# Patient Record
Sex: FEMALE | Race: BLACK OR AFRICAN AMERICAN | NOT HISPANIC OR LATINO | Employment: FULL TIME | URBAN - NONMETROPOLITAN AREA
[De-identification: names, ages, dates, MRNs, and addresses within clinical notes are randomized per-mention and may not be internally consistent; named-entity substitution may affect disease eponyms.]

---

## 2021-08-10 DIAGNOSIS — J32.9 SINUSITIS, UNSPECIFIED CHRONICITY, UNSPECIFIED LOCATION: Primary | ICD-10-CM

## 2021-08-10 RX ORDER — FLUTICASONE PROPIONATE 50 MCG
2 SPRAY, SUSPENSION (ML) NASAL DAILY
Qty: 9.9 ML | Refills: 1 | Status: SHIPPED | OUTPATIENT
Start: 2021-08-10 | End: 2022-02-04

## 2021-08-10 RX ORDER — FLUTICASONE PROPIONATE 50 MCG
2 SPRAY, SUSPENSION (ML) NASAL DAILY
COMMUNITY
Start: 2021-02-20 | End: 2021-08-10 | Stop reason: SDUPTHER

## 2021-11-03 ENCOUNTER — OFFICE VISIT (OUTPATIENT)
Dept: FAMILY MEDICINE | Facility: CLINIC | Age: 51
End: 2021-11-03
Payer: COMMERCIAL

## 2021-11-03 VITALS
RESPIRATION RATE: 18 BRPM | WEIGHT: 132.5 LBS | SYSTOLIC BLOOD PRESSURE: 151 MMHG | DIASTOLIC BLOOD PRESSURE: 99 MMHG | HEART RATE: 94 BPM | TEMPERATURE: 98 F | BODY MASS INDEX: 21.29 KG/M2 | OXYGEN SATURATION: 99 % | HEIGHT: 66 IN

## 2021-11-03 DIAGNOSIS — N76.0 ACUTE VAGINITIS: ICD-10-CM

## 2021-11-03 DIAGNOSIS — N76.0 BV (BACTERIAL VAGINOSIS): ICD-10-CM

## 2021-11-03 DIAGNOSIS — G89.29 CHRONIC NECK PAIN: ICD-10-CM

## 2021-11-03 DIAGNOSIS — B96.89 BV (BACTERIAL VAGINOSIS): ICD-10-CM

## 2021-11-03 DIAGNOSIS — M54.2 CHRONIC NECK PAIN: ICD-10-CM

## 2021-11-03 DIAGNOSIS — N89.8 VAGINAL DISCHARGE: Primary | ICD-10-CM

## 2021-11-03 LAB
BACTERIA VAG QL WET PREP: ABNORMAL /HPF
BILIRUB SERPL-MCNC: NEGATIVE MG/DL
BLOOD URINE, POC: NORMAL
CLUE CELLS VAG QL WET PREP: ABNORMAL /HPF
COLOR, POC UA: YELLOW
GLUCOSE UR QL STRIP: NEGATIVE
KETONES UR QL STRIP: NEGATIVE
LEUKOCYTE ESTERASE URINE, POC: NEGATIVE
NITRITE, POC UA: NEGATIVE
PH, POC UA: 5.5
PROTEIN, POC: NEGATIVE
RBC #/AREA VAG WET PREP: ABNORMAL /HPF
SPECIFIC GRAVITY, POC UA: 1.02
SQUAMOUS EPITHELIALS WET WOUNT, GENITAL: ABNORMAL /HPF
T VAGINALIS VAG QL WET PREP: ABNORMAL /HPF
UROBILINOGEN, POC UA: NEGATIVE
WBC CLUMPS WET MOUNT, GENITAL: ABNORMAL /HPF
WBC VAG QL WET PREP: ABNORMAL /HPF
YEAST VAG QL WET PREP: ABNORMAL /HPF

## 2021-11-03 PROCEDURE — 81003 URINALYSIS AUTO W/O SCOPE: CPT | Mod: QW,,, | Performed by: NURSE PRACTITIONER

## 2021-11-03 PROCEDURE — 99213 PR OFFICE/OUTPT VISIT, EST, LEVL III, 20-29 MIN: ICD-10-PCS | Mod: 25,,, | Performed by: NURSE PRACTITIONER

## 2021-11-03 PROCEDURE — 3077F PR MOST RECENT SYSTOLIC BLOOD PRESSURE >= 140 MM HG: ICD-10-PCS | Mod: ,,, | Performed by: NURSE PRACTITIONER

## 2021-11-03 PROCEDURE — 3008F BODY MASS INDEX DOCD: CPT | Mod: ,,, | Performed by: NURSE PRACTITIONER

## 2021-11-03 PROCEDURE — 3008F PR BODY MASS INDEX (BMI) DOCUMENTED: ICD-10-PCS | Mod: ,,, | Performed by: NURSE PRACTITIONER

## 2021-11-03 PROCEDURE — 3080F PR MOST RECENT DIASTOLIC BLOOD PRESSURE >= 90 MM HG: ICD-10-PCS | Mod: ,,, | Performed by: NURSE PRACTITIONER

## 2021-11-03 PROCEDURE — 3080F DIAST BP >= 90 MM HG: CPT | Mod: ,,, | Performed by: NURSE PRACTITIONER

## 2021-11-03 PROCEDURE — 96372 THER/PROPH/DIAG INJ SC/IM: CPT | Mod: ,,, | Performed by: NURSE PRACTITIONER

## 2021-11-03 PROCEDURE — 3077F SYST BP >= 140 MM HG: CPT | Mod: ,,, | Performed by: NURSE PRACTITIONER

## 2021-11-03 PROCEDURE — 87210 SMEAR WET MOUNT SALINE/INK: CPT | Mod: ,,, | Performed by: CLINICAL MEDICAL LABORATORY

## 2021-11-03 PROCEDURE — 87210 WET PREP, GENITAL: ICD-10-PCS | Mod: ,,, | Performed by: CLINICAL MEDICAL LABORATORY

## 2021-11-03 PROCEDURE — 99213 OFFICE O/P EST LOW 20 MIN: CPT | Mod: 25,,, | Performed by: NURSE PRACTITIONER

## 2021-11-03 PROCEDURE — 81003 POCT URINALYSIS W/O SCOPE: ICD-10-PCS | Mod: QW,,, | Performed by: NURSE PRACTITIONER

## 2021-11-03 PROCEDURE — 96372 PR INJECTION,THERAP/PROPH/DIAG2ST, IM OR SUBCUT: ICD-10-PCS | Mod: ,,, | Performed by: NURSE PRACTITIONER

## 2021-11-03 RX ORDER — ESTRADIOL 2 MG/1
2 TABLET ORAL DAILY
COMMUNITY
Start: 2021-09-22 | End: 2022-04-01 | Stop reason: SDUPTHER

## 2021-11-03 RX ORDER — KETOROLAC TROMETHAMINE 30 MG/ML
60 INJECTION, SOLUTION INTRAMUSCULAR; INTRAVENOUS
Status: COMPLETED | OUTPATIENT
Start: 2021-11-03 | End: 2021-11-03

## 2021-11-03 RX ORDER — FLUCONAZOLE 150 MG/1
150 TABLET ORAL DAILY
Qty: 1 TABLET | Refills: 0 | Status: SHIPPED | OUTPATIENT
Start: 2021-11-03 | End: 2021-11-04

## 2021-11-03 RX ADMIN — KETOROLAC TROMETHAMINE 60 MG: 30 INJECTION, SOLUTION INTRAMUSCULAR; INTRAVENOUS at 05:11

## 2021-11-08 RX ORDER — METRONIDAZOLE 500 MG/1
500 TABLET ORAL EVERY 12 HOURS
COMMUNITY
End: 2022-02-04

## 2021-11-08 RX ORDER — METRONIDAZOLE 7.5 MG/G
1 GEL VAGINAL NIGHTLY
Qty: 70 G | Refills: 0 | Status: SHIPPED | OUTPATIENT
Start: 2021-11-08 | End: 2022-02-04 | Stop reason: SDUPTHER

## 2021-11-09 RX ORDER — METRONIDAZOLE 500 MG/1
500 TABLET ORAL EVERY 12 HOURS
Qty: 14 TABLET | Refills: 0 | OUTPATIENT
Start: 2021-11-09

## 2021-12-21 ENCOUNTER — TELEPHONE (OUTPATIENT)
Dept: FAMILY MEDICINE | Facility: CLINIC | Age: 51
End: 2021-12-21
Payer: COMMERCIAL

## 2022-01-14 ENCOUNTER — IMMUNIZATION (OUTPATIENT)
Dept: FAMILY MEDICINE | Facility: CLINIC | Age: 52
End: 2022-01-14
Payer: COMMERCIAL

## 2022-01-14 DIAGNOSIS — Z23 NEED FOR VACCINATION: Primary | ICD-10-CM

## 2022-01-14 PROCEDURE — 91306 COVID-19, MRNA, LNP-S, PF, 100 MCG/0.25 ML DOSE VACCINE (MODERNA BOOSTER): ICD-10-PCS | Mod: ,,, | Performed by: NURSE PRACTITIONER

## 2022-01-14 PROCEDURE — 91306 COVID-19, MRNA, LNP-S, PF, 100 MCG/0.25 ML DOSE VACCINE (MODERNA BOOSTER): CPT | Mod: ,,, | Performed by: NURSE PRACTITIONER

## 2022-01-14 PROCEDURE — 0064A COVID-19, MRNA, LNP-S, PF, 100 MCG/0.25 ML DOSE VACCINE (MODERNA BOOSTER): ICD-10-PCS | Mod: ,,, | Performed by: NURSE PRACTITIONER

## 2022-01-14 PROCEDURE — 0064A COVID-19, MRNA, LNP-S, PF, 100 MCG/0.25 ML DOSE VACCINE (MODERNA BOOSTER): CPT | Mod: ,,, | Performed by: NURSE PRACTITIONER

## 2022-02-04 ENCOUNTER — OFFICE VISIT (OUTPATIENT)
Dept: FAMILY MEDICINE | Facility: CLINIC | Age: 52
End: 2022-02-04

## 2022-02-04 VITALS
WEIGHT: 132.38 LBS | BODY MASS INDEX: 21.28 KG/M2 | SYSTOLIC BLOOD PRESSURE: 150 MMHG | HEIGHT: 66 IN | TEMPERATURE: 98 F | DIASTOLIC BLOOD PRESSURE: 94 MMHG | HEART RATE: 88 BPM

## 2022-02-04 VITALS
RESPIRATION RATE: 16 BRPM | SYSTOLIC BLOOD PRESSURE: 135 MMHG | TEMPERATURE: 98 F | OXYGEN SATURATION: 99 % | DIASTOLIC BLOOD PRESSURE: 93 MMHG | HEIGHT: 66 IN | HEART RATE: 78 BPM | BODY MASS INDEX: 21.28 KG/M2 | WEIGHT: 132.38 LBS

## 2022-02-04 DIAGNOSIS — R59.0 ANTERIOR CERVICAL LYMPHADENOPATHY: ICD-10-CM

## 2022-02-04 DIAGNOSIS — B96.89 BV (BACTERIAL VAGINOSIS): ICD-10-CM

## 2022-02-04 DIAGNOSIS — I10 ESSENTIAL HYPERTENSION, BENIGN: Primary | ICD-10-CM

## 2022-02-04 DIAGNOSIS — Z12.11 SCREENING FOR MALIGNANT NEOPLASM OF COLON: ICD-10-CM

## 2022-02-04 DIAGNOSIS — N76.0 BV (BACTERIAL VAGINOSIS): ICD-10-CM

## 2022-02-04 DIAGNOSIS — Z12.39 ENCOUNTER FOR SCREENING FOR MALIGNANT NEOPLASM OF BREAST, UNSPECIFIED SCREENING MODALITY: Primary | ICD-10-CM

## 2022-02-04 DIAGNOSIS — G43.109 MIGRAINE WITH AURA AND WITHOUT STATUS MIGRAINOSUS, NOT INTRACTABLE: Chronic | ICD-10-CM

## 2022-02-04 DIAGNOSIS — R59.0 LOCALIZED ENLARGED LYMPH NODES: ICD-10-CM

## 2022-02-04 DIAGNOSIS — M54.2 NECK PAIN: ICD-10-CM

## 2022-02-04 DIAGNOSIS — R59.9 ENLARGED LYMPH NODE: ICD-10-CM

## 2022-02-04 RX ORDER — HYDROCHLOROTHIAZIDE 12.5 MG/1
12.5 TABLET ORAL DAILY
Qty: 30 TABLET | Refills: 5 | Status: SHIPPED | OUTPATIENT
Start: 2022-02-04 | End: 2022-02-04

## 2022-02-04 RX ORDER — MELOXICAM 15 MG/1
15 TABLET ORAL DAILY
Qty: 30 TABLET | Refills: 2 | Status: ON HOLD | OUTPATIENT
Start: 2022-02-04 | End: 2022-03-25 | Stop reason: ALTCHOICE

## 2022-02-04 RX ORDER — METRONIDAZOLE 7.5 MG/G
1 GEL VAGINAL NIGHTLY
Qty: 70 G | Refills: 0 | Status: ON HOLD | OUTPATIENT
Start: 2022-02-04 | End: 2022-03-25 | Stop reason: ALTCHOICE

## 2022-02-04 RX ORDER — KETOROLAC TROMETHAMINE 30 MG/ML
60 INJECTION, SOLUTION INTRAMUSCULAR; INTRAVENOUS
Status: COMPLETED | OUTPATIENT
Start: 2022-02-04 | End: 2022-02-04

## 2022-02-04 RX ORDER — TIZANIDINE 4 MG/1
4 TABLET ORAL EVERY 6 HOURS PRN
Qty: 30 TABLET | Refills: 2 | Status: SHIPPED | OUTPATIENT
Start: 2022-02-04 | End: 2022-02-04

## 2022-02-04 RX ORDER — METHOCARBAMOL 500 MG/1
500 TABLET, FILM COATED ORAL 3 TIMES DAILY
Qty: 60 TABLET | Refills: 0 | Status: ON HOLD | OUTPATIENT
Start: 2022-02-04 | End: 2022-03-25 | Stop reason: ALTCHOICE

## 2022-02-04 RX ADMIN — KETOROLAC TROMETHAMINE 60 MG: 30 INJECTION, SOLUTION INTRAMUSCULAR; INTRAVENOUS at 12:02

## 2022-02-04 NOTE — PROGRESS NOTES
[facility] - FAMILY MEDICINE  Phone: [phone]       PATIENT NAME: Thea Sun   : 1970    AGE: 51 y.o. DATE: 2022    MRN: [id number]        Reason for Visit / Chief Complaint:  Headache (Patient stated that she is here for chronic headaches. Patient stated that every since she gotten her booster she been having more frequent headaches. Patient stated that she is also here to get a lymphoid on the right side of her jaw checked out . )     Subjective:     HPI:  51 yr old AAF presents to the office for chronic HA  Deteriorating discs in neck - constant tension and pressure from neck to shoulders  Blood pressure elevated last couple of visits     Swollen lymph node under right jaw x 3 weeks     Review of Systems:    Pertinent items are above noted in HPI.    Review of patient's allergies indicates:   Allergen Reactions    Ampicillin Hives    Nalbuphine hcl Nausea And Vomiting        Med List:  Current Outpatient Medications on File Prior to Visit   Medication Sig Dispense Refill    estradioL (ESTRACE) 2 MG tablet Take 2 mg by mouth once daily.      fluticasone propionate (FLONASE) 50 mcg/actuation nasal spray 2 sprays (100 mcg total) by Each Nostril route once daily. (Patient not taking: Reported on 11/3/2021) 9.9 mL 1    metroNIDAZOLE (FLAGYL) 500 MG tablet Take 500 mg by mouth every 12 (twelve) hours.      [DISCONTINUED] metroNIDAZOLE (METROGEL) 0.75 % vaginal gel Place 1 applicator vaginally every evening. 70 g 0     No current facility-administered medications on file prior to visit.       Medical/Social/Family History:  No past medical history on file.   Social History     Tobacco Use   Smoking Status Never Smoker   Smokeless Tobacco Never Used      Social History     Substance and Sexual Activity   Alcohol Use Never       Family History   Problem Relation Age of Onset    Hypertension Mother     Cancer Mother     Heart failure Mother     Stroke  "Father     Dementia Father     Hypertension Father     Heart failure Sister     Hypertension Sister       Past Surgical History:   Procedure Laterality Date    ADENOIDECTOMY       SECTION      CHOLECYSTECTOMY      HYSTERECTOMY      OOPHORECTOMY        Objective:      Vitals:    22 1104 22 1106   BP: (!) 125/90 (!) 135/93   BP Location: Left arm Right arm   Patient Position: Sitting Sitting   Pulse: 78    Resp: 16    Temp: 97.5 °F (36.4 °C)    SpO2: 99%    Weight: 60.1 kg (132 lb 6.4 oz)    Height: 5' 6" (1.676 m)      Body mass index is 21.37 kg/m².     Physical Exam:    General: well developed, well nourished    Head: normocephalic, atraumatic    Neck: supple, symmetrical, trachea midline, no adenopathy and thyroid: not enlarged, symmetric, no tenderness/mass/nodules    Respiratory: unlabored respirations, no intercostal retractions or accessory muscle use    Cardiovascular: normal rate     Abdomen: soft, nontender    Musculoskeletal: negative; full range of motion, no tenderness, palpable spasm or pain on motion    Lymphadenopathy: right lymph node under jaw enlarged - non tender upon palpation     Neurological: normal without focal findings and mental status, speech normal, alert and oriented x3    Skin: Skin color, texture, turgor normal. No rashes or lesions    Psych: no auditory or visual hallucinations, no anxiety, no depression/worrying alot       Assessment:          ICD-10-CM ICD-9-CM   1. Essential hypertension, benign  I10 401.1   2. BV (bacterial vaginosis)  N76.0 616.10    B96.89 041.9   3. Neck pain  M54.2 723.1   4. Enlarged lymph node  R59.9 785.6        Plan:       Essential hypertension, benign  -     hydroCHLOROthiazide (HYDRODIURIL) 12.5 MG Tab; Take 1 tablet (12.5 mg total) by mouth once daily.  Dispense: 30 tablet; Refill: 5    BV (bacterial vaginosis)  -     metroNIDAZOLE (METROGEL) 0.75 % vaginal gel; Place 1 applicator vaginally every evening.  Dispense: 70 g; " Refill: 0    Neck pain  -     ketorolac injection 60 mg  -     tiZANidine (ZANAFLEX) 4 MG tablet; Take 1 tablet (4 mg total) by mouth every 6 (six) hours as needed (neck pain).  Dispense: 30 tablet; Refill: 2    Enlarged lymph node  -     US Soft Tissue Head Neck Thyroid; Future; Expected date: 02/04/2022    neck/pain likely cause of chronic headaches flaring up but need BP under control prior to spinal mgmt     Current Outpatient Medications:     estradioL (ESTRACE) 2 MG tablet, Take 2 mg by mouth once daily., Disp: , Rfl:     fluticasone propionate (FLONASE) 50 mcg/actuation nasal spray, 2 sprays (100 mcg total) by Each Nostril route once daily. (Patient not taking: Reported on 11/3/2021), Disp: 9.9 mL, Rfl: 1    hydroCHLOROthiazide (HYDRODIURIL) 12.5 MG Tab, Take 1 tablet (12.5 mg total) by mouth once daily., Disp: 30 tablet, Rfl: 5    metroNIDAZOLE (FLAGYL) 500 MG tablet, Take 500 mg by mouth every 12 (twelve) hours., Disp: , Rfl:     metroNIDAZOLE (METROGEL) 0.75 % vaginal gel, Place 1 applicator vaginally every evening., Disp: 70 g, Rfl: 0    tiZANidine (ZANAFLEX) 4 MG tablet, Take 1 tablet (4 mg total) by mouth every 6 (six) hours as needed (neck pain)., Disp: 30 tablet, Rfl: 2  No current facility-administered medications for this visit.        New & refilled meds:  Requested Prescriptions     Signed Prescriptions Disp Refills    metroNIDAZOLE (METROGEL) 0.75 % vaginal gel 70 g 0     Sig: Place 1 applicator vaginally every evening.    hydroCHLOROthiazide (HYDRODIURIL) 12.5 MG Tab 30 tablet 5     Sig: Take 1 tablet (12.5 mg total) by mouth once daily.    tiZANidine (ZANAFLEX) 4 MG tablet 30 tablet 2     Sig: Take 1 tablet (4 mg total) by mouth every 6 (six) hours as needed (neck pain).     Treatment Recommendations:    Orders and follow up as documented in patient record.    Return to clinic 1 week for BP recheck and as needed.    Signature: DAVIDE Meyer

## 2022-02-04 NOTE — PROGRESS NOTES
Buddy Jorgensen MD    [address] [place] [address]     PATIENT NAME: Thea Sun  : 1970  DATE: 22  MRN: [id number]      Billing Provider: Buddy Jorgensen MD  Level of Service: ND OFFICE/OUTPT VISIT, EST, LEVL IV, 30-39 MIN  Patient PCP Information     Provider PCP Type    [facility]          Reason for Visit / Chief Complaint: Headache (Pt. Presents to the clinic with complaints of headaches that have gotten worse since having the Covid booster. She does have chronic migraines but states this is not like her typical migraines. /She is also having pain/tension in her neck. States she was referred to [facility] in the past for this. She had three injections and then was told she would not be a surgery candidate. /Blood pressure is elevated in clinic today. )       Update PCP  Update Chief Complaint         History of Present Illness / Problem Focused Workflow     Thea Sun presents to the clinic with Headache (Pt. Presents to the clinic with complaints of headaches that have gotten worse since having the Covid booster. She does have chronic migraines but states this is not like her typical migraines. /She is also having pain/tension in her neck. States she was referred to [facility] in the past for this. She had three injections and then was told she would not be a surgery candidate. /Blood pressure is elevated in clinic today. )     Headaches - history of complex migraine syndrome, headaches are worsening since she got the COVID-19 booster, also has a history of cervical disc disease that lead to neck tension and pain, also thought to be a contributing factor to her headaches    Recent Headache - posterior, traveling down into her neck, unable to work the past 2 days, no sensitivity to light, no nausea,     HTN - today her blood pressure is elevated, several other recent blood pressures are elevated    Right cervical  lymphadenopathy - started about 3 weeks ago, it had enlarged at one point, but it is now somewhat better over the past couple of days, she took some advil pm and did not notice any improvement, believes she had a similar episode in the very distant past, only recent infection type symptoms were frontal headache which did occur before the lymphnode started swelling    She feels like she has some pressure in the right ear with some reduced hearing, she wears a headset at work most all day      Review of Systems     Review of Systems   Constitutional: Negative for activity change, appetite change, chills, fatigue and fever.   HENT: Negative for nasal congestion, ear pain, hearing loss, postnasal drip and sore throat.    Respiratory: Negative for cough, chest tightness, shortness of breath and wheezing.    Cardiovascular: Negative for chest pain, palpitations, leg swelling and claudication.   Gastrointestinal: Negative for abdominal pain, change in bowel habit, constipation, diarrhea, nausea, vomiting and change in bowel habit.   Genitourinary: Negative for dysuria.   Musculoskeletal: Negative for arthralgias, back pain, gait problem and myalgias.   Integumentary:  Negative for rash.   Neurological: Positive for headaches. Negative for weakness.   Psychiatric/Behavioral: Negative for suicidal ideas. The patient is not nervous/anxious.         Medical / Social / Family History     Past Medical History:   Diagnosis Date    Endometriosis     Migraines        Past Surgical History:   Procedure Laterality Date    APPENDECTOMY      bowel obstruction      Repair of bowel obstruction.    BREAST LUMPECTOMY Left      SECTION      CHOLECYSTECTOMY      HYSTERECTOMY      OOPHORECTOMY      UTERINE SUSPENSION         Social History  Ms.  reports that she has never smoked. She has never used smokeless tobacco. She reports that she does not drink alcohol and does not use drugs.    Family History  Ms.'s family history  includes Cancer in her mother; Dementia in her father; Heart failure in her mother and sister; Hypertension in her father, mother, and sister; Stroke in her father.    Medications and Allergies     Medications  Outpatient Medications Marked as Taking for the 2/4/22 encounter (Office Visit) with Buddy Jorgensen MD   Medication Sig Dispense Refill    estradioL (ESTRACE) 2 MG tablet Take 2 mg by mouth once daily.      metroNIDAZOLE (METROGEL) 0.75 % vaginal gel Place 1 applicator vaginally every evening. 70 g 0       Allergies  Review of patient's allergies indicates:   Allergen Reactions    Ampicillin Hives    Nalbuphine hcl Nausea And Vomiting       Physical Examination     Vitals:    02/04/22 1409   BP: (!) 150/94   Pulse:    Temp:      Physical Exam  Vitals and nursing note reviewed.   Constitutional:       General: She is not in acute distress.     Appearance: Normal appearance. She is not ill-appearing.   Eyes:      Extraocular Movements: Extraocular movements intact.      Pupils: Pupils are equal, round, and reactive to light.   Cardiovascular:      Rate and Rhythm: Normal rate and regular rhythm.      Heart sounds: Normal heart sounds.   Pulmonary:      Effort: Pulmonary effort is normal.      Breath sounds: Normal breath sounds.   Abdominal:      General: Bowel sounds are normal.      Palpations: Abdomen is soft.   Musculoskeletal:         General: Normal range of motion.   Skin:     Findings: No rash.   Neurological:      General: No focal deficit present.      Mental Status: She is alert and oriented to person, place, and time. Mental status is at baseline.   Psychiatric:         Mood and Affect: Mood normal.         Behavior: Behavior normal.          Assessment and Plan (including Health Maintenance)      Problem List  Smart Sets  Document Outside HM   :    Plan:         Health Maintenance Due   Topic Date Due    Hepatitis C Screening  Never done    Lipid Panel  Never done    HIV Screening   Never done    TETANUS VACCINE  Never done    Mammogram  Never done    Colorectal Cancer Screening  Never done       Problem List Items Addressed This Visit        Other    Anterior cervical lymphadenopathy    Relevant Orders    CT Soft Tissue Neck With Contrast      Other Visit Diagnoses     Encounter for screening for malignant neoplasm of breast, unspecified screening modality    -  Primary    Relevant Orders    Mammo Digital Screening Bilat    Screening for malignant neoplasm of colon        Relevant Orders    Ambulatory referral/consult to Gastroenterology    Localized enlarged lymph nodes        Relevant Orders    CT Soft Tissue Neck With Contrast    Migraine with aura and without status migrainosus, not intractable  (Chronic)           Encounter for screening for malignant neoplasm of breast, unspecified screening modality  -     Mammo Digital Screening Bilat; Future; Expected date: 02/04/2022    Screening for malignant neoplasm of colon  -     Ambulatory referral/consult to Gastroenterology; Future; Expected date: 02/11/2022    Anterior cervical lymphadenopathy  -     CT Soft Tissue Neck With Contrast; Future; Expected date: 02/04/2022    Localized enlarged lymph nodes  -     CT Soft Tissue Neck With Contrast; Future; Expected date: 02/04/2022    Migraine with aura and without status migrainosus, not intractable    Other orders  -     methocarbamoL (ROBAXIN) 500 MG Tab; Take 1 tablet (500 mg total) by mouth 3 (three) times daily. As needed for MUSCLE TENSION  Dispense: 60 tablet; Refill: 0  -     meloxicam (MOBIC) 15 MG tablet; Take 1 tablet (15 mg total) by mouth once daily. For PAIN and INFLAMMATION  Dispense: 30 tablet; Refill: 2       The patient has no Health Maintenance topics of status Not Due    Procedures     Future Appointments   Date Time Provider Department Center   2/24/2022  3:30 PM [facility] CT[facility] CT IC Isaac Mcgregor   2/24/2022  4:30 PM Buddy Jorgensen MD [facility] SHAHZAD Lopez         Follow up if symptoms worsen or fail to improve.     Signature:  Buddy Jorgensen MD  [address] [place] [address]  Phone #: [phone]  Fax #: [phone]    Date of encounter: 2/4/22    Patient Instructions   Patient Education       Lymphadenitis Discharge Instructions   About this topic   Lymphadenitis is another word for a swollen lymph node. A lymph node is a pea-shaped part of the lymph system that helps defend our body from germs. You have lymph nodes all over your body. They help trap foreign material and other cells that do not belong inside the body.  A swollen lymph node may mean there is an infection or disease in the body. Treatment for this condition depends on the cause. Most often, you will need drugs to treat your illness.  What care is needed at home?   · Ask your doctor what you need to do when you go home. Make sure you ask questions if you do not understand what the doctor says.  · Do not squeeze or try to drain the lymph node. This can make any infection you may have worse.  · Place an ice pack or a bag of frozen peas wrapped in a towel over the painful part. Never put ice right on the skin. Do not leave the ice on more than 10 to 15 minutes at a time.  · If your doctor tells you to use heat, put a heating pad on your painful area for no more than 20 minutes at a time. Never go to sleep with a heating pad on as this can cause burns.  What follow-up care is needed?   Your doctor may ask you to make visits to the office to check on your progress. Be sure to keep these visits.  What drugs may be needed?   The doctor may order drugs to:  · Help with pain and swelling  · Treat an infection  · Treat the illness that causes the lymph nodes to swell and be painful  What problems could happen?   · Infection  · Pain  When do I need to call the doctor?   · Signs of infection. These include a fever of 100.4°F (38°C) or higher, chills.  · More  swelling, redness, or warmth over your lymph node.  · Lymph node is draining fluid or pus.  · Lymph nodes seem to be getting bigger.  · Night sweats  · Trouble swallowing or breathing  Teach Back: Helping You Understand   The Teach Back Method helps you understand the information we are giving you. After you talk with the staff, tell them in your own words what you learned. This helps to make sure the staff has described each thing clearly. It also helps to explain things that may have been confusing. Before going home, make sure you can do these:  · I can tell you about my condition.  · I can tell you what may help ease my pain.  · I can tell you what I will do if I have a fever, my lymph nodes stay large or seem to be getting bigger, or I have trouble breathing or swallowing.  Last Reviewed Date   2019-04-18  Consumer Information Use and Disclaimer   This information is not specific medical advice and does not replace information you receive from your health care provider. This is only a brief summary of general information. It does NOT include all information about conditions, illnesses, injuries, tests, procedures, treatments, therapies, discharge instructions or life-style choices that may apply to you. You must talk with your health care provider for complete information about your health and treatment options. This information should not be used to decide whether or not to accept your health care providers advice, instructions or recommendations. Only your health care provider has the knowledge and training to provide advice that is right for you.  Copyright   Copyright © 2021 [employer] and its affiliates and/or licensors. All rights reserved.  Patient Education       Migraines Discharge Instructions   About this topic   A migraine is a specific type of headache. All headaches are not migraines. A migraine is caused by abnormal brain activity. It may be due to widening or narrowing of the blood vessels  in the head. It may last for a couple of hours or even a few days.  There are two types of migraine headaches:  · Classic migraine or migraine with aura ? It often starts with some problems in your eyesight called auras. You may see spots, dots, or even zigzag lines. Some people will lose part of their vision. An aura is the signal that a migraine is coming. It is followed by a very bad headache on one side of the head. Noise, light, and other activities can make it worse. Sometimes, it comes with upset stomach and throwing up.  · Common migraine or migraine without aura ? This migraine happens without the signal that the headache is coming.  Migraines can be treated with different drugs. Some drugs treat the pain. Other drugs stop the brain activity that causes the migraine. If migraines happen often, drugs that prevent migraines can help. Migraines can be helped by sleep. Stress control like exercise, relaxation, and a regular routine of good nutrition and sleep are all helpful in preventing migraines. Lifestyle and dietary changes may also help you deal with a migraine.         What care is needed at home?   · Ask your doctor what you need to do when you go home. Make sure you ask questions if you do not understand what the doctor says. This way you will know what you need to do.  · Your doctor may give you drugs to help with the pain. Take them as ordered by your doctor.  · Your doctor may teach you some ways to help control your migraine like relaxation and exercises.  · Keep a diary about your headaches. Write down when your headache happens. Write down what you were doing before it happened. Write down any foods or drinks that you had in the last day. This will help you learn what might be causing your headaches. Then, you can learn how to avoid them.  · Place an ice pack or a bag of frozen peas wrapped in a towel over your head. Never put ice right on the skin. Do not leave the ice on more than 10 to 15  minutes at a time.  · Using heat may also help. Try using a heating pad on the back of your neck. A warm shower or bath may also relax tight muscles.  · Avoid bright lights and noise. Rest in a quiet, dark room. Sleep may also help.  · Drink a caffeinated beverage. Be careful to not drink too much caffeine as this can cause other headaches.  · Do not make any big decisions until your migraine goes away.  · If certain drugs your doctor ordered trigger your migraine, your doctor may tell you to stop taking those drugs.  What follow-up care is needed?   · Your doctor may ask you to make visits to the office to check on your progress. Be sure to keep these visits.  · Your doctor may send you to a specialist called a neurologist.  · Your doctor may request that you get a brain MRI.  What drugs may be needed?   The doctor may order drugs to:  · Help with pain  · Relieve the migraine  · Prevent a migraine attack  · Treat an upset stomach and throwing up  · Treat a hormonal problem  Will physical activity be limited?   · Regular exercise can help prevent migraines. If exercise triggers your migraine, talk to your doctor.  · Do not drive or run machinery until your migraine goes away.  What changes to diet are needed?   · Do not skip or delay meals. Drink 6 to 8 glasses of water each day. This may help prevent migraines.  · Avoid foods that may trigger the attack. These may include processed, fermented, pickled, or marinated foods. Some of these are baked goods, chocolate, dairy products, nuts, onions, and peanut butter. Others are fruits like avocado, banana, or citrus fruit and meats like persaud, hot dogs, and cured meats.  · Limit caffeine intake. You will find that caffeine may help relieve pain. But, too much caffeine may also trigger an attack.  · Avoid drinking beer, wine, and mixed drinks (alcohol) if you think this is causing your migraines.  · Quit smoking. Smoking can worsen your migraine.  What problems could  happen?   · Loss of work time or missing school if migraines come often  · Low mood, worry  · Poor quality of life  · Migraines can slightly raise your chances of having a stroke   What can be done to prevent this health problem?   · Some drugs may help prevent migraines. Talk to your doctor about the drugs you may need to take.  · Keep a sleeping routine. Go to sleep and get up the same time every day.  · Be active. Exercise can reduce your risk of having migraines.  · Take note of the things that may trigger your migraine. Learning what they are is very important to help avoid an attack.  · Try to keep stress in your life low. Try relaxation exercises or meditation to lower stress.  When do I need to call the doctor?   · Signs of a bad reaction. These include very bad headache beyond the usual; speech, vision, motion problems or loss of balance; headaches are worse when lying down or headaches suddenly starts. Call for emergency help right away.  · Changes in migraine attacks  · Migraines that happen more often than 3 times a month  · You are not feeling better in 2 to 3 days or you are feeling worse  Teach Back: Helping You Understand   The Teach Back Method helps you understand the information we are giving you. After you talk with the staff, tell them in your own words what you learned. This helps to make sure the staff has described each thing clearly. It also helps to explain things that may have been confusing. Before going home, make sure you can do these:  · I can tell you about my condition.  · I can tell you what may help ease my pain.  · I can tell you what I will do if there is a change in my headaches.  Where can I learn more?   American Academy of Pediatrics  https://www.healthychildren.org/English/health-issues/conditions/head-neck-nervous-system/Pages/Migraine-Headaches-in-Children.aspx   NHS Choices  http://www.nhs.uk/Conditions/Migraine/Pages/Causes.aspx   Last Reviewed Date    2020-05-12  Consumer Information Use and Disclaimer   This information is not specific medical advice and does not replace information you receive from your health care provider. This is only a brief summary of general information. It does NOT include all information about conditions, illnesses, injuries, tests, procedures, treatments, therapies, discharge instructions or life-style choices that may apply to you. You must talk with your health care provider for complete information about your health and treatment options. This information should not be used to decide whether or not to accept your health care providers advice, instructions or recommendations. Only your health care provider has the knowledge and training to provide advice that is right for you.  Copyright   Copyright © 2021 [employer] and its affiliates and/or licensors. All rights reserved.

## 2022-02-04 NOTE — LETTER
February 4, 2022    Thea Sun  [address]  [place] [address]         [place] [address] [place] [address]  Phone: [phone]  Fax: [phone] February 4, 2022     Patient: Thea Sun   YOB: 1970   Date of Visit: 2/4/2022       To Whom It May Concern:    It is my medical opinion that Thea Sun needs to be excused from work on 2/3/2022 - 2/4/2022.     If you have any questions or concerns, please don't hesitate to call.    Sincerely,        TRENT Bautista

## 2022-02-11 NOTE — PATIENT INSTRUCTIONS
Patient Education       Lymphadenitis Discharge Instructions   About this topic   Lymphadenitis is another word for a swollen lymph node. A lymph node is a pea-shaped part of the lymph system that helps defend our body from germs. You have lymph nodes all over your body. They help trap foreign material and other cells that do not belong inside the body.  A swollen lymph node may mean there is an infection or disease in the body. Treatment for this condition depends on the cause. Most often, you will need drugs to treat your illness.  What care is needed at home?   · Ask your doctor what you need to do when you go home. Make sure you ask questions if you do not understand what the doctor says.  · Do not squeeze or try to drain the lymph node. This can make any infection you may have worse.  · Place an ice pack or a bag of frozen peas wrapped in a towel over the painful part. Never put ice right on the skin. Do not leave the ice on more than 10 to 15 minutes at a time.  · If your doctor tells you to use heat, put a heating pad on your painful area for no more than 20 minutes at a time. Never go to sleep with a heating pad on as this can cause burns.  What follow-up care is needed?   Your doctor may ask you to make visits to the office to check on your progress. Be sure to keep these visits.  What drugs may be needed?   The doctor may order drugs to:  · Help with pain and swelling  · Treat an infection  · Treat the illness that causes the lymph nodes to swell and be painful  What problems could happen?   · Infection  · Pain  When do I need to call the doctor?   · Signs of infection. These include a fever of 100.4°F (38°C) or higher, chills.  · More swelling, redness, or warmth over your lymph node.  · Lymph node is draining fluid or pus.  · Lymph nodes seem to be getting bigger.  · Night sweats  · Trouble swallowing or breathing  Teach Back: Helping You Understand   The Teach Back Method helps you understand the  information we are giving you. After you talk with the staff, tell them in your own words what you learned. This helps to make sure the staff has described each thing clearly. It also helps to explain things that may have been confusing. Before going home, make sure you can do these:  · I can tell you about my condition.  · I can tell you what may help ease my pain.  · I can tell you what I will do if I have a fever, my lymph nodes stay large or seem to be getting bigger, or I have trouble breathing or swallowing.  Last Reviewed Date   2019-04-18  Consumer Information Use and Disclaimer   This information is not specific medical advice and does not replace information you receive from your health care provider. This is only a brief summary of general information. It does NOT include all information about conditions, illnesses, injuries, tests, procedures, treatments, therapies, discharge instructions or life-style choices that may apply to you. You must talk with your health care provider for complete information about your health and treatment options. This information should not be used to decide whether or not to accept your health care providers advice, instructions or recommendations. Only your health care provider has the knowledge and training to provide advice that is right for you.  Copyright   Copyright © 2021 [employer] and its affiliates and/or licensors. All rights reserved.  Patient Education       Migraines Discharge Instructions   About this topic   A migraine is a specific type of headache. All headaches are not migraines. A migraine is caused by abnormal brain activity. It may be due to widening or narrowing of the blood vessels in the head. It may last for a couple of hours or even a few days.  There are two types of migraine headaches:  · Classic migraine or migraine with aura ? It often starts with some problems in your eyesight called auras. You may see spots, dots, or even zigzag lines.  Some people will lose part of their vision. An aura is the signal that a migraine is coming. It is followed by a very bad headache on one side of the head. Noise, light, and other activities can make it worse. Sometimes, it comes with upset stomach and throwing up.  · Common migraine or migraine without aura ? This migraine happens without the signal that the headache is coming.  Migraines can be treated with different drugs. Some drugs treat the pain. Other drugs stop the brain activity that causes the migraine. If migraines happen often, drugs that prevent migraines can help. Migraines can be helped by sleep. Stress control like exercise, relaxation, and a regular routine of good nutrition and sleep are all helpful in preventing migraines. Lifestyle and dietary changes may also help you deal with a migraine.         What care is needed at home?   · Ask your doctor what you need to do when you go home. Make sure you ask questions if you do not understand what the doctor says. This way you will know what you need to do.  · Your doctor may give you drugs to help with the pain. Take them as ordered by your doctor.  · Your doctor may teach you some ways to help control your migraine like relaxation and exercises.  · Keep a diary about your headaches. Write down when your headache happens. Write down what you were doing before it happened. Write down any foods or drinks that you had in the last day. This will help you learn what might be causing your headaches. Then, you can learn how to avoid them.  · Place an ice pack or a bag of frozen peas wrapped in a towel over your head. Never put ice right on the skin. Do not leave the ice on more than 10 to 15 minutes at a time.  · Using heat may also help. Try using a heating pad on the back of your neck. A warm shower or bath may also relax tight muscles.  · Avoid bright lights and noise. Rest in a quiet, dark room. Sleep may also help.  · Drink a caffeinated beverage. Be  careful to not drink too much caffeine as this can cause other headaches.  · Do not make any big decisions until your migraine goes away.  · If certain drugs your doctor ordered trigger your migraine, your doctor may tell you to stop taking those drugs.  What follow-up care is needed?   · Your doctor may ask you to make visits to the office to check on your progress. Be sure to keep these visits.  · Your doctor may send you to a specialist called a neurologist.  · Your doctor may request that you get a brain MRI.  What drugs may be needed?   The doctor may order drugs to:  · Help with pain  · Relieve the migraine  · Prevent a migraine attack  · Treat an upset stomach and throwing up  · Treat a hormonal problem  Will physical activity be limited?   · Regular exercise can help prevent migraines. If exercise triggers your migraine, talk to your doctor.  · Do not drive or run machinery until your migraine goes away.  What changes to diet are needed?   · Do not skip or delay meals. Drink 6 to 8 glasses of water each day. This may help prevent migraines.  · Avoid foods that may trigger the attack. These may include processed, fermented, pickled, or marinated foods. Some of these are baked goods, chocolate, dairy products, nuts, onions, and peanut butter. Others are fruits like avocado, banana, or citrus fruit and meats like persaud, hot dogs, and cured meats.  · Limit caffeine intake. You will find that caffeine may help relieve pain. But, too much caffeine may also trigger an attack.  · Avoid drinking beer, wine, and mixed drinks (alcohol) if you think this is causing your migraines.  · Quit smoking. Smoking can worsen your migraine.  What problems could happen?   · Loss of work time or missing school if migraines come often  · Low mood, worry  · Poor quality of life  · Migraines can slightly raise your chances of having a stroke   What can be done to prevent this health problem?   · Some drugs may help prevent migraines.  Talk to your doctor about the drugs you may need to take.  · Keep a sleeping routine. Go to sleep and get up the same time every day.  · Be active. Exercise can reduce your risk of having migraines.  · Take note of the things that may trigger your migraine. Learning what they are is very important to help avoid an attack.  · Try to keep stress in your life low. Try relaxation exercises or meditation to lower stress.  When do I need to call the doctor?   · Signs of a bad reaction. These include very bad headache beyond the usual; speech, vision, motion problems or loss of balance; headaches are worse when lying down or headaches suddenly starts. Call for emergency help right away.  · Changes in migraine attacks  · Migraines that happen more often than 3 times a month  · You are not feeling better in 2 to 3 days or you are feeling worse  Teach Back: Helping You Understand   The Teach Back Method helps you understand the information we are giving you. After you talk with the staff, tell them in your own words what you learned. This helps to make sure the staff has described each thing clearly. It also helps to explain things that may have been confusing. Before going home, make sure you can do these:  · I can tell you about my condition.  · I can tell you what may help ease my pain.  · I can tell you what I will do if there is a change in my headaches.  Where can I learn more?   American Academy of Pediatrics  https://www.healthychildren.org/English/health-issues/conditions/head-neck-nervous-system/Pages/Migraine-Headaches-in-Children.aspx   NHS Choices  http://www.nhs.uk/Conditions/Migraine/Pages/Causes.aspx   Last Reviewed Date   2020-05-12  Consumer Information Use and Disclaimer   This information is not specific medical advice and does not replace information you receive from your health care provider. This is only a brief summary of general information. It does NOT include all information about conditions,  illnesses, injuries, tests, procedures, treatments, therapies, discharge instructions or life-style choices that may apply to you. You must talk with your health care provider for complete information about your health and treatment options. This information should not be used to decide whether or not to accept your health care providers advice, instructions or recommendations. Only your health care provider has the knowledge and training to provide advice that is right for you.  Copyright   Copyright © 2021 [employer] and its affiliates and/or licensors. All rights reserved.

## 2022-02-21 LAB — CRC RECOMMENDATION EXT: NORMAL

## 2022-02-24 ENCOUNTER — HOSPITAL ENCOUNTER (OUTPATIENT)
Dept: RADIOLOGY | Facility: HOSPITAL | Age: 52
Discharge: HOME | End: 2022-02-24
Attending: FAMILY MEDICINE
Payer: COMMERCIAL

## 2022-02-24 ENCOUNTER — OFFICE VISIT (OUTPATIENT)
Dept: FAMILY MEDICINE | Facility: CLINIC | Age: 52
End: 2022-02-24
Payer: COMMERCIAL

## 2022-02-24 VITALS
WEIGHT: 133.19 LBS | SYSTOLIC BLOOD PRESSURE: 140 MMHG | BODY MASS INDEX: 21.4 KG/M2 | HEART RATE: 84 BPM | RESPIRATION RATE: 18 BRPM | DIASTOLIC BLOOD PRESSURE: 90 MMHG | TEMPERATURE: 98 F | OXYGEN SATURATION: 99 % | HEIGHT: 66 IN

## 2022-02-24 DIAGNOSIS — G89.29 CHRONIC NECK PAIN: ICD-10-CM

## 2022-02-24 DIAGNOSIS — Z11.4 SCREENING FOR HIV (HUMAN IMMUNODEFICIENCY VIRUS): ICD-10-CM

## 2022-02-24 DIAGNOSIS — R59.0 ANTERIOR CERVICAL LYMPHADENOPATHY: ICD-10-CM

## 2022-02-24 DIAGNOSIS — Z11.59 ENCOUNTER FOR HEPATITIS C SCREENING TEST FOR LOW RISK PATIENT: ICD-10-CM

## 2022-02-24 DIAGNOSIS — Z13.220 SCREENING FOR LIPOID DISORDERS: ICD-10-CM

## 2022-02-24 DIAGNOSIS — R59.0 LOCALIZED ENLARGED LYMPH NODES: ICD-10-CM

## 2022-02-24 DIAGNOSIS — M54.2 CHRONIC NECK PAIN: ICD-10-CM

## 2022-02-24 DIAGNOSIS — Z00.01 ENCOUNTER FOR ANNUAL GENERAL MEDICAL EXAMINATION WITH ABNORMAL FINDINGS IN ADULT: Primary | ICD-10-CM

## 2022-02-24 DIAGNOSIS — Z13.1 SCREENING FOR DIABETES MELLITUS: ICD-10-CM

## 2022-02-24 LAB
CHOLEST SERPL-MCNC: 201 MG/DL (ref 0–200)
CHOLEST/HDLC SERPL: 3.6 {RATIO}
EST. AVERAGE GLUCOSE BLD GHB EST-MCNC: 97 MG/DL
GLUCOSE SERPL-MCNC: 90 MG/DL (ref 74–106)
HBA1C MFR BLD HPLC: 5.5 % (ref 4.5–6.6)
HCV AB SER QL: NORMAL
HDLC SERPL-MCNC: 56 MG/DL (ref 40–60)
HIV 1+O+2 AB SERPL QL: NORMAL
LDLC SERPL CALC-MCNC: 105 MG/DL
LDLC/HDLC SERPL: 1.9 {RATIO}
NONHDLC SERPL-MCNC: 145 MG/DL
TRIGL SERPL-MCNC: 199 MG/DL (ref 35–150)
VLDLC SERPL-MCNC: 40 MG/DL

## 2022-02-24 PROCEDURE — 3008F PR BODY MASS INDEX (BMI) DOCUMENTED: ICD-10-PCS | Mod: ,,, | Performed by: FAMILY MEDICINE

## 2022-02-24 PROCEDURE — 99396 PR PREVENTIVE VISIT,EST,40-64: ICD-10-PCS | Mod: ,,, | Performed by: FAMILY MEDICINE

## 2022-02-24 PROCEDURE — 3044F PR MOST RECENT HEMOGLOBIN A1C LEVEL <7.0%: ICD-10-PCS | Mod: ,,, | Performed by: FAMILY MEDICINE

## 2022-02-24 PROCEDURE — 80061 LIPID PANEL: ICD-10-PCS | Mod: ,,, | Performed by: CLINICAL MEDICAL LABORATORY

## 2022-02-24 PROCEDURE — 1160F RVW MEDS BY RX/DR IN RCRD: CPT | Mod: ,,, | Performed by: FAMILY MEDICINE

## 2022-02-24 PROCEDURE — 3077F SYST BP >= 140 MM HG: CPT | Mod: ,,, | Performed by: FAMILY MEDICINE

## 2022-02-24 PROCEDURE — 25500020 PHARM REV CODE 255: Performed by: FAMILY MEDICINE

## 2022-02-24 PROCEDURE — 86803 HEPATITIS C ANTIBODY: ICD-10-PCS | Mod: ,,, | Performed by: CLINICAL MEDICAL LABORATORY

## 2022-02-24 PROCEDURE — 3080F DIAST BP >= 90 MM HG: CPT | Mod: ,,, | Performed by: FAMILY MEDICINE

## 2022-02-24 PROCEDURE — 3080F PR MOST RECENT DIASTOLIC BLOOD PRESSURE >= 90 MM HG: ICD-10-PCS | Mod: ,,, | Performed by: FAMILY MEDICINE

## 2022-02-24 PROCEDURE — 87389 HIV 1 / 2 ANTIBODY: ICD-10-PCS | Mod: ,,, | Performed by: CLINICAL MEDICAL LABORATORY

## 2022-02-24 PROCEDURE — 3044F HG A1C LEVEL LT 7.0%: CPT | Mod: ,,, | Performed by: FAMILY MEDICINE

## 2022-02-24 PROCEDURE — 70491 CT SOFT TISSUE NECK W/DYE: CPT | Mod: TC

## 2022-02-24 PROCEDURE — 1159F PR MEDICATION LIST DOCUMENTED IN MEDICAL RECORD: ICD-10-PCS | Mod: ,,, | Performed by: FAMILY MEDICINE

## 2022-02-24 PROCEDURE — 82947 ASSAY GLUCOSE BLOOD QUANT: CPT | Mod: ,,, | Performed by: CLINICAL MEDICAL LABORATORY

## 2022-02-24 PROCEDURE — 86803 HEPATITIS C AB TEST: CPT | Mod: ,,, | Performed by: CLINICAL MEDICAL LABORATORY

## 2022-02-24 PROCEDURE — 83036 HEMOGLOBIN A1C: ICD-10-PCS | Mod: ,,, | Performed by: CLINICAL MEDICAL LABORATORY

## 2022-02-24 PROCEDURE — 87389 HIV-1 AG W/HIV-1&-2 AB AG IA: CPT | Mod: ,,, | Performed by: CLINICAL MEDICAL LABORATORY

## 2022-02-24 PROCEDURE — 1160F PR REVIEW ALL MEDS BY PRESCRIBER/CLIN PHARMACIST DOCUMENTED: ICD-10-PCS | Mod: ,,, | Performed by: FAMILY MEDICINE

## 2022-02-24 PROCEDURE — 82947 GLUCOSE, FASTING: ICD-10-PCS | Mod: ,,, | Performed by: CLINICAL MEDICAL LABORATORY

## 2022-02-24 PROCEDURE — 80061 LIPID PANEL: CPT | Mod: ,,, | Performed by: CLINICAL MEDICAL LABORATORY

## 2022-02-24 PROCEDURE — 1159F MED LIST DOCD IN RCRD: CPT | Mod: ,,, | Performed by: FAMILY MEDICINE

## 2022-02-24 PROCEDURE — 83036 HEMOGLOBIN GLYCOSYLATED A1C: CPT | Mod: ,,, | Performed by: CLINICAL MEDICAL LABORATORY

## 2022-02-24 PROCEDURE — 3008F BODY MASS INDEX DOCD: CPT | Mod: ,,, | Performed by: FAMILY MEDICINE

## 2022-02-24 PROCEDURE — 3077F PR MOST RECENT SYSTOLIC BLOOD PRESSURE >= 140 MM HG: ICD-10-PCS | Mod: ,,, | Performed by: FAMILY MEDICINE

## 2022-02-24 PROCEDURE — 99396 PREV VISIT EST AGE 40-64: CPT | Mod: ,,, | Performed by: FAMILY MEDICINE

## 2022-02-24 RX ORDER — CYCLOBENZAPRINE HCL 10 MG
10 TABLET ORAL 3 TIMES DAILY PRN
Qty: 90 TABLET | Refills: 2 | Status: SHIPPED | OUTPATIENT
Start: 2022-02-24 | End: 2022-03-06

## 2022-02-24 RX ADMIN — IOPAMIDOL 100 ML: 755 INJECTION, SOLUTION INTRAVENOUS at 04:02

## 2022-02-24 NOTE — PROGRESS NOTES
Buddy Jorgensen MD    [address] [place] [address]     PATIENT NAME: Thea Sun  : 1970  DATE: 22  MRN: [id number]      Billing Provider: Buddy Jorgensen MD  Level of Service: DC PREVENTIVE VISIT,EST,40-64  Patient PCP Information     Provider PCP Type    Buddy Jorgensen MD General          Reason for Visit / Chief Complaint: Healthy You (Healthy You #1  )       Update PCP  Update Chief Complaint         History of Present Illness / Problem Focused Workflow     Thea Sun presents to the clinic with Healthy You (Healthy You #1  )     HPI    Review of Systems     Review of Systems   Constitutional: Negative for activity change, appetite change, chills, fatigue and fever.   HENT: Negative for nasal congestion, ear pain, hearing loss, postnasal drip and sore throat.    Respiratory: Negative for cough, chest tightness, shortness of breath and wheezing.    Cardiovascular: Negative for chest pain, palpitations, leg swelling and claudication.   Gastrointestinal: Negative for abdominal pain, change in bowel habit, constipation, diarrhea, nausea, vomiting and change in bowel habit.   Genitourinary: Negative for dysuria.   Musculoskeletal: Negative for arthralgias, back pain, gait problem and myalgias.   Integumentary:  Negative for rash.   Neurological: Negative for weakness and headaches.   Psychiatric/Behavioral: Negative for suicidal ideas. The patient is not nervous/anxious.         Medical / Social / Family History     Past Medical History:   Diagnosis Date    Endometriosis     Migraines        Past Surgical History:   Procedure Laterality Date    APPENDECTOMY      bowel obstruction      Repair of bowel obstruction.    BREAST LUMPECTOMY Left      SECTION      CHOLECYSTECTOMY      HYSTERECTOMY      OOPHORECTOMY      UTERINE SUSPENSION         Social History  Ms.  reports that she has never smoked. She has never used  smokeless tobacco. She reports that she does not drink alcohol and does not use drugs.    Family History  Ms.'s family history includes Cancer in her mother; Dementia in her father; Heart failure in her mother and sister; Hypertension in her father, mother, and sister; Stroke in her father.    Medications and Allergies     Medications  Outpatient Medications Marked as Taking for the 2/24/22 encounter (Office Visit) with Buddy Jorgensen MD   Medication Sig Dispense Refill    estradioL (ESTRACE) 2 MG tablet Take 2 mg by mouth once daily.      meloxicam (MOBIC) 15 MG tablet Take 1 tablet (15 mg total) by mouth once daily. For PAIN and INFLAMMATION 30 tablet 2    methocarbamoL (ROBAXIN) 500 MG Tab Take 1 tablet (500 mg total) by mouth 3 (three) times daily. As needed for MUSCLE TENSION 60 tablet 0    metroNIDAZOLE (METROGEL) 0.75 % vaginal gel Place 1 applicator vaginally every evening. 70 g 0       Allergies  Review of patient's allergies indicates:   Allergen Reactions    Ampicillin Hives    Nalbuphine hcl Nausea And Vomiting       Physical Examination     Vitals:    02/24/22 1635   BP: (!) 140/90   Pulse:    Resp:    Temp:      Physical Exam  Vitals and nursing note reviewed.   Constitutional:       General: She is not in acute distress.     Appearance: Normal appearance. She is not ill-appearing.   Eyes:      Extraocular Movements: Extraocular movements intact.      Pupils: Pupils are equal, round, and reactive to light.   Cardiovascular:      Rate and Rhythm: Normal rate and regular rhythm.      Heart sounds: Normal heart sounds.   Pulmonary:      Effort: Pulmonary effort is normal.      Breath sounds: Normal breath sounds.   Abdominal:      General: Bowel sounds are normal.      Palpations: Abdomen is soft.   Musculoskeletal:         General: Normal range of motion.   Skin:     Findings: No rash.   Neurological:      General: No focal deficit present.      Mental Status: She is alert and oriented to  person, place, and time. Mental status is at baseline.   Psychiatric:         Mood and Affect: Mood normal.         Behavior: Behavior normal.          Assessment and Plan (including Health Maintenance)      Problem List  Smart Sets  Document Outside HM   :    Plan:     [employer] Healthy You Wellness Plan    Overall Health Goal:   Healthy Lifestyle Choices    Biometrics  Attend Regularly scheduled office visits  Monitor blood pressure and keep a log       Lifestyle  Schedule Yearly Mammogram- mammogram is pending, will be done at [facility] next month  Colonoscopy was performed in the past few weeks, repeat in 10 years   Take medications as prescribed  Develop a good social support system    Nutrition  Avoiding adding table salt to food  Recommend weight loss  Eat well balanced meals  Decrease intake of fast foods    Exercise  Recommend physical activity for at least 30 minutes, 5 days per week     Tobacco   Avoid all tobacco products         Health Maintenance Due   Topic Date Due    TETANUS VACCINE  Never done    Mammogram  Never done    COVID-19 Vaccine (3 - Pfizer risk 4-dose series) 02/11/2022         Encounter for annual general medical examination with abnormal findings in adult    Screening for diabetes mellitus  -     Glucose, Fasting; Future; Expected date: 02/24/2022  -     Hemoglobin A1C; Future; Expected date: 02/24/2022    Screening for lipoid disorders  -     Lipid Panel; Future; Expected date: 02/24/2022    Screening for HIV (human immunodeficiency virus)  -     HIV 1/2 Ag/Ab (4th Gen); Future; Expected date: 02/24/2022    Encounter for hepatitis C screening test for low risk patient  -     Hepatitis C Antibody; Future; Expected date: 02/24/2022    Anterior cervical lymphadenopathy    Chronic neck pain  -     cyclobenzaprine (FLEXERIL) 10 MG tablet; Take 1 tablet (10 mg total) by mouth 3 (three) times daily as needed for Muscle spasms.  Dispense: 90 tablet; Refill: 2       Health Maintenance Topics  with due status: Not Due       Topic Last Completion Date    Colorectal Cancer Screening 02/21/2022    Lipid Panel 02/24/2022       Procedures     Future Appointments   Date Time Provider Department Center   8/23/2022  3:30 PM Buddy Jorgensen MD [facility]        Follow up if symptoms worsen or fail to improve.     Signature:  Buddy Jorgensen MD  [address] [place] [address]  Phone #: [phone]  Fax #: [phone]    Date of encounter: 2/24/22    There are no Patient Instructions on file for this visit.

## 2022-02-24 NOTE — ASSESSMENT & PLAN NOTE
[facility] Healthy You Wellness Plan    Overall Health Goal:   Healthy Lifestyle Choices  Improve Overall health  Weight Loss    Biometrics  Attend Regularly scheduled office visits  Monitor blood pressure and keep a log   Monitor glucose and keep a log      Lifestyle  Schedule Yearly Mammogram  Schedule colonoscopy  Take medications as prescribed  Develop a good social support system    Nutrition  Avoiding adding table salt to food  Recommend weight loss  Eat well balanced meals  Decrease intake of fast foods    Exercise  Recommend physical activity for at least 30 minutes, 5 days per week     Tobacco   Avoid all tobacco products   Stop smoking

## 2022-02-25 ENCOUNTER — TELEPHONE (OUTPATIENT)
Dept: FAMILY MEDICINE | Facility: CLINIC | Age: 52
End: 2022-02-25
Payer: COMMERCIAL

## 2022-02-25 DIAGNOSIS — K11.5 SALIVARY DUCT STONE: Primary | ICD-10-CM

## 2022-02-26 ENCOUNTER — CLINICAL SUPPORT (OUTPATIENT)
Dept: FAMILY MEDICINE | Facility: CLINIC | Age: 52
End: 2022-02-26
Payer: COMMERCIAL

## 2022-02-26 VITALS — SYSTOLIC BLOOD PRESSURE: 152 MMHG | DIASTOLIC BLOOD PRESSURE: 94 MMHG | HEART RATE: 86 BPM

## 2022-03-07 ENCOUNTER — OFFICE VISIT (OUTPATIENT)
Dept: OTOLARYNGOLOGY | Facility: CLINIC | Age: 52
End: 2022-03-07
Payer: COMMERCIAL

## 2022-03-07 VITALS — HEIGHT: 66 IN | WEIGHT: 135 LBS | BODY MASS INDEX: 21.69 KG/M2

## 2022-03-07 DIAGNOSIS — K11.5 SALIVARY DUCT STONE: ICD-10-CM

## 2022-03-07 PROCEDURE — 3008F BODY MASS INDEX DOCD: CPT | Mod: ,,, | Performed by: OTOLARYNGOLOGY

## 2022-03-07 PROCEDURE — 99204 PR OFFICE/OUTPT VISIT, NEW, LEVL IV, 45-59 MIN: ICD-10-PCS | Mod: S$PBB,,, | Performed by: OTOLARYNGOLOGY

## 2022-03-07 PROCEDURE — 3008F PR BODY MASS INDEX (BMI) DOCUMENTED: ICD-10-PCS | Mod: ,,, | Performed by: OTOLARYNGOLOGY

## 2022-03-07 PROCEDURE — 99204 OFFICE O/P NEW MOD 45 MIN: CPT | Mod: S$PBB,,, | Performed by: OTOLARYNGOLOGY

## 2022-03-07 PROCEDURE — 1159F MED LIST DOCD IN RCRD: CPT | Mod: ,,, | Performed by: OTOLARYNGOLOGY

## 2022-03-07 PROCEDURE — 1159F PR MEDICATION LIST DOCUMENTED IN MEDICAL RECORD: ICD-10-PCS | Mod: ,,, | Performed by: OTOLARYNGOLOGY

## 2022-03-07 PROCEDURE — 3044F PR MOST RECENT HEMOGLOBIN A1C LEVEL <7.0%: ICD-10-PCS | Mod: ,,, | Performed by: OTOLARYNGOLOGY

## 2022-03-07 PROCEDURE — 1160F PR REVIEW ALL MEDS BY PRESCRIBER/CLIN PHARMACIST DOCUMENTED: ICD-10-PCS | Mod: ,,, | Performed by: OTOLARYNGOLOGY

## 2022-03-07 PROCEDURE — 99214 OFFICE O/P EST MOD 30 MIN: CPT | Mod: PBBFAC | Performed by: OTOLARYNGOLOGY

## 2022-03-07 PROCEDURE — 3044F HG A1C LEVEL LT 7.0%: CPT | Mod: ,,, | Performed by: OTOLARYNGOLOGY

## 2022-03-07 PROCEDURE — 1160F RVW MEDS BY RX/DR IN RCRD: CPT | Mod: ,,, | Performed by: OTOLARYNGOLOGY

## 2022-03-07 NOTE — H&P (VIEW-ONLY)
Subjective:       Patient ID: Thea Sun is a 51 y.o. female.    Chief Complaint: Mass (Right side of neck, pt states she was told it is a stone, has been tender for a while and c/o dry mouth. )    HPI  Review of Systems   HENT: Positive for facial swelling and sore throat.    All other systems reviewed and are negative.      Objective:      Physical Exam  General: NAD  Head: Normocephalic, atraumatic, no facial asymmetry/normal strength,  Ears: Both auricules normal in appearance, w/o deformities tympanic membranes normal external auditory canals normal  Nose: External nose w/o deformities normal turbinates no drainage or inflammation  Oral Cavity: Lips, gums, floor of mouth, tongue hard palate, and buccal mucosa without mass/lesion  Oropharynx: Mucosa pink and moist, soft palate, posterior pharynx and oropharyngeal wall without mass/lesion  Neck: Supple, symmetric, trachea midline, no palpable mass/lesion, no palpable cervical lymphadenopathy large right submandibular stone felt on bimanual palpation   Skin: Warm and dry, no concerning lesions  Respiratory: Respirations even, unlabored    Assessment:       1. Salivary duct stone        Plan:       Removal stone in OR    Reviewed recent CT scan

## 2022-03-21 ENCOUNTER — TELEPHONE (OUTPATIENT)
Dept: FAMILY MEDICINE | Facility: CLINIC | Age: 52
End: 2022-03-21
Payer: COMMERCIAL

## 2022-03-21 DIAGNOSIS — Z12.31 ENCOUNTER FOR SCREENING MAMMOGRAM FOR MALIGNANT NEOPLASM OF BREAST: Primary | ICD-10-CM

## 2022-03-21 NOTE — TELEPHONE ENCOUNTER
----- Message from Ángel Enrique sent at 3/21/2022  1:11 PM CDT -----  Regarding: Need order for Diagnostic mammo  [facility] said they need an order for Diagnostic left breast mammogram.  The patient has already had screening but needs an order for diagnostic mammogram (left breast).  Please let us know when you get this ordered so we can get it sent to them ASAP.  Thanks,  Eboni

## 2022-03-22 DIAGNOSIS — R92.8 ABNORMAL MAMMOGRAM OF LEFT BREAST: Primary | ICD-10-CM

## 2022-03-22 DIAGNOSIS — Z12.31 ENCOUNTER FOR SCREENING MAMMOGRAM FOR MALIGNANT NEOPLASM OF BREAST: Primary | ICD-10-CM

## 2022-03-22 LAB — BCS RECOMMENDATION EXT: NORMAL

## 2022-03-24 RX ORDER — FLUTICASONE PROPIONATE 50 MCG
SPRAY, SUSPENSION (ML) NASAL
Status: ON HOLD | COMMUNITY
Start: 2022-03-11 | End: 2022-03-25 | Stop reason: ALTCHOICE

## 2022-03-24 RX ORDER — AMOXICILLIN AND CLAVULANATE POTASSIUM 875; 125 MG/1; MG/1
1 TABLET, FILM COATED ORAL 2 TIMES DAILY
Status: ON HOLD | COMMUNITY
Start: 2022-03-11 | End: 2022-03-25 | Stop reason: ALTCHOICE

## 2022-03-25 ENCOUNTER — HOSPITAL ENCOUNTER (OUTPATIENT)
Facility: HOSPITAL | Age: 52
Discharge: HOME | End: 2022-03-25
Attending: OTOLARYNGOLOGY | Admitting: OTOLARYNGOLOGY
Payer: COMMERCIAL

## 2022-03-25 ENCOUNTER — ANESTHESIA (OUTPATIENT)
Dept: SURGERY | Facility: HOSPITAL | Age: 52
End: 2022-03-25
Payer: COMMERCIAL

## 2022-03-25 ENCOUNTER — ANESTHESIA EVENT (OUTPATIENT)
Dept: SURGERY | Facility: HOSPITAL | Age: 52
End: 2022-03-25
Payer: COMMERCIAL

## 2022-03-25 VITALS
RESPIRATION RATE: 15 BRPM | DIASTOLIC BLOOD PRESSURE: 80 MMHG | OXYGEN SATURATION: 96 % | BODY MASS INDEX: 21.38 KG/M2 | HEART RATE: 75 BPM | HEIGHT: 66 IN | SYSTOLIC BLOOD PRESSURE: 137 MMHG | WEIGHT: 133 LBS | TEMPERATURE: 98 F

## 2022-03-25 DIAGNOSIS — K11.5 SALIVARY GLAND STONE: Primary | ICD-10-CM

## 2022-03-25 DIAGNOSIS — K11.3 SALIVARY GLAND ABSCESS: ICD-10-CM

## 2022-03-25 DIAGNOSIS — K11.5 SALIVARY DUCT STONE: ICD-10-CM

## 2022-03-25 PROCEDURE — 27000165 HC TUBE, ETT CUFFED: Performed by: ANESTHESIOLOGY

## 2022-03-25 PROCEDURE — 42330 PR REMOVAL SALIVARY STONE,UNCOMPLIC: ICD-10-PCS | Mod: ,,, | Performed by: OTOLARYNGOLOGY

## 2022-03-25 PROCEDURE — 88300 SURGICAL PATHOLOGY: ICD-10-PCS | Mod: 26,,, | Performed by: PATHOLOGY

## 2022-03-25 PROCEDURE — 27201423 OPTIME MED/SURG SUP & DEVICES STERILE SUPPLY: Performed by: OTOLARYNGOLOGY

## 2022-03-25 PROCEDURE — 63600175 PHARM REV CODE 636 W HCPCS

## 2022-03-25 PROCEDURE — 25000003 PHARM REV CODE 250

## 2022-03-25 PROCEDURE — D9220A PRA ANESTHESIA: Mod: CRNA,,,

## 2022-03-25 PROCEDURE — 27000260 *HC AIRWAY ORAL: Performed by: ANESTHESIOLOGY

## 2022-03-25 PROCEDURE — 42330 REMOVAL OF SALIVARY STONE: CPT | Mod: ,,, | Performed by: OTOLARYNGOLOGY

## 2022-03-25 PROCEDURE — D9220A PRA ANESTHESIA: Mod: ANES,,, | Performed by: ANESTHESIOLOGY

## 2022-03-25 PROCEDURE — 63600175 PHARM REV CODE 636 W HCPCS: Performed by: ANESTHESIOLOGY

## 2022-03-25 PROCEDURE — 88300 SURGICAL PATH GROSS: CPT | Mod: SUR | Performed by: OTOLARYNGOLOGY

## 2022-03-25 PROCEDURE — 25000003 PHARM REV CODE 250: Performed by: OTOLARYNGOLOGY

## 2022-03-25 PROCEDURE — D9220A PRA ANESTHESIA: ICD-10-PCS | Mod: CRNA,,,

## 2022-03-25 PROCEDURE — 36000706: Performed by: OTOLARYNGOLOGY

## 2022-03-25 PROCEDURE — 88300 SURGICAL PATH GROSS: CPT | Mod: 26,,, | Performed by: PATHOLOGY

## 2022-03-25 PROCEDURE — 27000655: Performed by: ANESTHESIOLOGY

## 2022-03-25 PROCEDURE — 27000716 HC OXISENSOR PROBE, ANY SIZE: Performed by: ANESTHESIOLOGY

## 2022-03-25 PROCEDURE — 36000707: Performed by: OTOLARYNGOLOGY

## 2022-03-25 PROCEDURE — 37000009 HC ANESTHESIA EA ADD 15 MINS: Performed by: OTOLARYNGOLOGY

## 2022-03-25 PROCEDURE — 71000039 HC RECOVERY, EACH ADD'L HOUR: Performed by: OTOLARYNGOLOGY

## 2022-03-25 PROCEDURE — 71000015 HC POSTOP RECOV 1ST HR: Performed by: OTOLARYNGOLOGY

## 2022-03-25 PROCEDURE — 37000008 HC ANESTHESIA 1ST 15 MINUTES: Performed by: OTOLARYNGOLOGY

## 2022-03-25 PROCEDURE — 71000033 HC RECOVERY, INTIAL HOUR: Performed by: OTOLARYNGOLOGY

## 2022-03-25 PROCEDURE — D9220A PRA ANESTHESIA: ICD-10-PCS | Mod: ANES,,, | Performed by: ANESTHESIOLOGY

## 2022-03-25 PROCEDURE — 27000689 HC BLADE LARYNGOSCOPE ANY SIZE: Performed by: ANESTHESIOLOGY

## 2022-03-25 PROCEDURE — 71000016 HC POSTOP RECOV ADDL HR: Performed by: OTOLARYNGOLOGY

## 2022-03-25 RX ORDER — HYDROMORPHONE HYDROCHLORIDE 2 MG/ML
0.5 INJECTION, SOLUTION INTRAMUSCULAR; INTRAVENOUS; SUBCUTANEOUS EVERY 5 MIN PRN
Status: DISCONTINUED | OUTPATIENT
Start: 2022-03-25 | End: 2022-03-25 | Stop reason: HOSPADM

## 2022-03-25 RX ORDER — HYDROCODONE BITARTRATE AND ACETAMINOPHEN 7.5; 325 MG/1; MG/1
1 TABLET ORAL EVERY 6 HOURS PRN
Status: DISCONTINUED | OUTPATIENT
Start: 2022-03-25 | End: 2022-03-25 | Stop reason: HOSPADM

## 2022-03-25 RX ORDER — MEPERIDINE HYDROCHLORIDE 25 MG/ML
25 INJECTION INTRAMUSCULAR; INTRAVENOUS; SUBCUTANEOUS EVERY 10 MIN PRN
Status: DISCONTINUED | OUTPATIENT
Start: 2022-03-25 | End: 2022-03-25 | Stop reason: HOSPADM

## 2022-03-25 RX ORDER — DEXAMETHASONE SODIUM PHOSPHATE 4 MG/ML
INJECTION, SOLUTION INTRA-ARTICULAR; INTRALESIONAL; INTRAMUSCULAR; INTRAVENOUS; SOFT TISSUE
Status: DISCONTINUED | OUTPATIENT
Start: 2022-03-25 | End: 2022-03-25

## 2022-03-25 RX ORDER — CLINDAMYCIN PHOSPHATE 900 MG/50ML
INJECTION, SOLUTION INTRAVENOUS
Status: DISCONTINUED | OUTPATIENT
Start: 2022-03-25 | End: 2022-03-25

## 2022-03-25 RX ORDER — PROPOFOL 10 MG/ML
VIAL (ML) INTRAVENOUS
Status: DISCONTINUED | OUTPATIENT
Start: 2022-03-25 | End: 2022-03-25

## 2022-03-25 RX ORDER — ONDANSETRON 2 MG/ML
4 INJECTION INTRAMUSCULAR; INTRAVENOUS DAILY PRN
Status: DISCONTINUED | OUTPATIENT
Start: 2022-03-25 | End: 2022-03-25 | Stop reason: HOSPADM

## 2022-03-25 RX ORDER — DIPHENHYDRAMINE HYDROCHLORIDE 50 MG/ML
25 INJECTION INTRAMUSCULAR; INTRAVENOUS EVERY 6 HOURS PRN
Status: DISCONTINUED | OUTPATIENT
Start: 2022-03-25 | End: 2022-03-25 | Stop reason: HOSPADM

## 2022-03-25 RX ORDER — ROCURONIUM BROMIDE 10 MG/ML
INJECTION, SOLUTION INTRAVENOUS
Status: DISCONTINUED | OUTPATIENT
Start: 2022-03-25 | End: 2022-03-25

## 2022-03-25 RX ORDER — LIDOCAINE HYDROCHLORIDE AND EPINEPHRINE 10; 10 MG/ML; UG/ML
INJECTION, SOLUTION INFILTRATION; PERINEURAL
Status: DISCONTINUED | OUTPATIENT
Start: 2022-03-25 | End: 2022-03-25 | Stop reason: HOSPADM

## 2022-03-25 RX ORDER — SODIUM CHLORIDE, SODIUM LACTATE, POTASSIUM CHLORIDE, CALCIUM CHLORIDE 600; 310; 30; 20 MG/100ML; MG/100ML; MG/100ML; MG/100ML
INJECTION, SOLUTION INTRAVENOUS CONTINUOUS
Status: DISCONTINUED | OUTPATIENT
Start: 2022-03-25 | End: 2022-03-25 | Stop reason: HOSPADM

## 2022-03-25 RX ORDER — MORPHINE SULFATE 10 MG/ML
4 INJECTION INTRAMUSCULAR; INTRAVENOUS; SUBCUTANEOUS EVERY 5 MIN PRN
Status: DISCONTINUED | OUTPATIENT
Start: 2022-03-25 | End: 2022-03-25 | Stop reason: HOSPADM

## 2022-03-25 RX ORDER — MIDAZOLAM HYDROCHLORIDE 1 MG/ML
INJECTION INTRAMUSCULAR; INTRAVENOUS
Status: DISCONTINUED | OUTPATIENT
Start: 2022-03-25 | End: 2022-03-25

## 2022-03-25 RX ORDER — LIDOCAINE HYDROCHLORIDE 20 MG/ML
INJECTION, SOLUTION EPIDURAL; INFILTRATION; INTRACAUDAL; PERINEURAL
Status: DISCONTINUED | OUTPATIENT
Start: 2022-03-25 | End: 2022-03-25

## 2022-03-25 RX ORDER — ONDANSETRON 2 MG/ML
INJECTION INTRAMUSCULAR; INTRAVENOUS
Status: DISCONTINUED | OUTPATIENT
Start: 2022-03-25 | End: 2022-03-25

## 2022-03-25 RX ORDER — FENTANYL CITRATE 50 UG/ML
INJECTION, SOLUTION INTRAMUSCULAR; INTRAVENOUS
Status: DISCONTINUED | OUTPATIENT
Start: 2022-03-25 | End: 2022-03-25

## 2022-03-25 RX ADMIN — ROCURONIUM BROMIDE 20 MG: 10 INJECTION, SOLUTION INTRAVENOUS at 08:03

## 2022-03-25 RX ADMIN — FENTANYL CITRATE 50 MCG: 50 INJECTION INTRAMUSCULAR; INTRAVENOUS at 08:03

## 2022-03-25 RX ADMIN — PROPOFOL 150 MG: 10 INJECTION, EMULSION INTRAVENOUS at 08:03

## 2022-03-25 RX ADMIN — ONDANSETRON 4 MG: 2 INJECTION INTRAMUSCULAR; INTRAVENOUS at 08:03

## 2022-03-25 RX ADMIN — MORPHINE SULFATE 4 MG: 10 INJECTION INTRAVENOUS at 09:03

## 2022-03-25 RX ADMIN — HYDROMORPHONE HYDROCHLORIDE 0.5 MG: 2 INJECTION INTRAMUSCULAR; INTRAVENOUS; SUBCUTANEOUS at 09:03

## 2022-03-25 RX ADMIN — MIDAZOLAM 2 MG: 1 INJECTION INTRAMUSCULAR; INTRAVENOUS at 08:03

## 2022-03-25 RX ADMIN — ONDANSETRON 4 MG: 2 INJECTION INTRAMUSCULAR; INTRAVENOUS at 12:03

## 2022-03-25 RX ADMIN — CLINDAMYCIN IN 5 PERCENT DEXTROSE 900 MG: 18 INJECTION, SOLUTION INTRAVENOUS at 08:03

## 2022-03-25 RX ADMIN — MORPHINE SULFATE 2 MG: 10 INJECTION INTRAVENOUS at 09:03

## 2022-03-25 RX ADMIN — LIDOCAINE HYDROCHLORIDE 60 MG: 20 INJECTION, SOLUTION INTRAVENOUS at 08:03

## 2022-03-25 RX ADMIN — SODIUM CHLORIDE, POTASSIUM CHLORIDE, SODIUM LACTATE AND CALCIUM CHLORIDE: 600; 310; 30; 20 INJECTION, SOLUTION INTRAVENOUS at 08:03

## 2022-03-25 RX ADMIN — DEXAMETHASONE SODIUM PHOSPHATE 8 MG: 4 INJECTION, SOLUTION INTRA-ARTICULAR; INTRALESIONAL; INTRAMUSCULAR; INTRAVENOUS; SOFT TISSUE at 08:03

## 2022-03-25 NOTE — OP NOTE
After general anesthesia t. It was injected with 1% xylocaine with epi 1:100,000 an incision was made down through superficial mucosa of right floor of mouth gentle dissection was made down to the large stone that was removed care was taken not to damage any nerves or blood vessels  It was left open  it was sent to pathology for permanent section. The patient tolerated the procedure well was taken to RR in stable condition.

## 2022-03-25 NOTE — PLAN OF CARE
Still c/o nausea when up.  Patient states she wants to attempt to get up and dressed to go home.

## 2022-03-25 NOTE — ANESTHESIA PROCEDURE NOTES
Intubation    Date/Time: 3/25/2022 8:45 AM  Performed by: Noble Schwartz II, CRNA  Authorized by: Jaden Acevedo MD     Intubation:     Induction:  Intravenous    Intubated:  Postinduction    Mask Ventilation:  Easy with oral airway    Attempts:  1    Attempted By:  CRNA    Method of Intubation:  Direct    Blade:  Zayda 3    Laryngeal View Grade: Grade I - full view of cords      Difficult Airway Encountered?: No      Complications:  None    Airway Device:  Oral endotracheal tube    Airway Device Size:  7.0    Style/Cuff Inflation:  Cuffed    Inflation Amount (mL):  7    Tube secured:  21    Secured at:  The lips    Placement Verified By:  Capnometry    Complicating Factors:  None    Findings Post-Intubation:  BS equal bilateral and atraumatic/condition of teeth unchanged

## 2022-03-25 NOTE — TRANSFER OF CARE
"Anesthesia Transfer of Care Note    Patient: Thea Sun    Procedure(s) Performed: Procedure(s) (LRB):  EXCISION, SALIVARY GLAND, STONE ON RIGHT SIDE (Right)    Patient location: PACU    Anesthesia Type: general    Transport from OR: Transported from OR on 100% O2 by closed face mask with adequate spontaneous ventilation    Post pain: adequate analgesia    Post assessment: no apparent anesthetic complications    Post vital signs: stable    Level of consciousness: responds to stimulation    Nausea/Vomiting: no nausea/vomiting    Complications: none    Transfer of care protocol was followed      Last vitals:   Visit Vitals  /77 (BP Location: Right arm, Patient Position: Lying)   Pulse 78   Temp 36.4 °C (97.5 °F) (Oral)   Resp 18   Ht 5' 6" (1.676 m)   Wt 60.3 kg (133 lb)   SpO2 98%   Breastfeeding No   BMI 21.47 kg/m²     "

## 2022-03-25 NOTE — ANESTHESIA PREPROCEDURE EVALUATION
03/25/2022  Thea uSn is a 51 y.o., female.      Pre-op Assessment    I have reviewed the Patient Summary Reports.    I have reviewed the NPO Status.   I have reviewed the Medications.     Review of Systems  Social:  Non-Smoker, No Alcohol Use    Hematology/Oncology:  Hematology Normal   Oncology Normal     EENT/Dental:EENT/Dental Normal   Cardiovascular:  Cardiovascular Normal     Pulmonary:  Pulmonary Normal    Renal/:  Renal/ Normal     Hepatic/GI:  Hepatic/GI Normal    Musculoskeletal:  Musculoskeletal Normal    Neurological:   Headaches    Endocrine:  Endocrine Normal    Dermatological:  Skin Normal    Psych:  Psychiatric Normal           Physical Exam  General: Well nourished, Cooperative, Alert and Oriented    Airway:  Mallampati: II / II  Mouth Opening: Normal  TM Distance: Normal  Neck ROM: Normal ROM    Dental:  Intact    Chest/Lungs:  Clear to auscultation    Heart:  Rate: Normal  Rhythm: Regular Rhythm  Sounds: Normal        Anesthesia Plan  Type of Anesthesia, risks & benefits discussed:    Anesthesia Type: Gen ETT  Intra-op Monitoring Plan: Standard ASA Monitors  Post Op Pain Control Plan: multimodal analgesia  Induction:  IV  Airway Plan: Direct  Informed Consent: Informed consent signed with the Patient and all parties understand the risks and agree with anesthesia plan.  All questions answered.   ASA Score: 1  Day of Surgery Review of History & Physical: H&P Update referred to the surgeon/provider.    Ready For Surgery From Anesthesia Perspective.     .

## 2022-03-25 NOTE — OR NURSING
0915 REC'D TO PACU IN STABLE COND. SLEEPING WAKES TO VOICES. VS STABLE    1015 TRANSFERRED TO OUT PT ROOM 15 IN STABLE COND. BEDSIDE REPORT GIVEN TO LEXI SEGOVIA. VS STABLE

## 2022-03-28 LAB
ESTROGEN SERPL-MCNC: NORMAL PG/ML
INSULIN SERPL-ACNC: NORMAL U[IU]/ML
LAB AP GROSS DESCRIPTION: NORMAL
LAB AP LABORATORY NOTES: NORMAL
T3RU NFR SERPL: NORMAL %

## 2022-03-31 ENCOUNTER — OFFICE VISIT (OUTPATIENT)
Dept: OTOLARYNGOLOGY | Facility: CLINIC | Age: 52
End: 2022-03-31
Payer: COMMERCIAL

## 2022-03-31 VITALS — WEIGHT: 133 LBS | BODY MASS INDEX: 21.38 KG/M2 | HEIGHT: 66 IN

## 2022-03-31 DIAGNOSIS — K11.5 SALIVARY DUCT STONE: Primary | ICD-10-CM

## 2022-03-31 DIAGNOSIS — H92.01 ACUTE OTALGIA, RIGHT: ICD-10-CM

## 2022-03-31 PROCEDURE — 1160F RVW MEDS BY RX/DR IN RCRD: CPT | Mod: ,,, | Performed by: OTOLARYNGOLOGY

## 2022-03-31 PROCEDURE — 1159F PR MEDICATION LIST DOCUMENTED IN MEDICAL RECORD: ICD-10-PCS | Mod: ,,, | Performed by: OTOLARYNGOLOGY

## 2022-03-31 PROCEDURE — 1159F MED LIST DOCD IN RCRD: CPT | Mod: ,,, | Performed by: OTOLARYNGOLOGY

## 2022-03-31 PROCEDURE — 3044F PR MOST RECENT HEMOGLOBIN A1C LEVEL <7.0%: ICD-10-PCS | Mod: ,,, | Performed by: OTOLARYNGOLOGY

## 2022-03-31 PROCEDURE — 99024 POSTOP FOLLOW-UP VISIT: CPT | Mod: ,,, | Performed by: OTOLARYNGOLOGY

## 2022-03-31 PROCEDURE — 1160F PR REVIEW ALL MEDS BY PRESCRIBER/CLIN PHARMACIST DOCUMENTED: ICD-10-PCS | Mod: ,,, | Performed by: OTOLARYNGOLOGY

## 2022-03-31 PROCEDURE — 99213 OFFICE O/P EST LOW 20 MIN: CPT | Mod: PBBFAC | Performed by: OTOLARYNGOLOGY

## 2022-03-31 PROCEDURE — 3008F PR BODY MASS INDEX (BMI) DOCUMENTED: ICD-10-PCS | Mod: ,,, | Performed by: OTOLARYNGOLOGY

## 2022-03-31 PROCEDURE — 99024 PR POST-OP FOLLOW-UP VISIT: ICD-10-PCS | Mod: ,,, | Performed by: OTOLARYNGOLOGY

## 2022-03-31 PROCEDURE — 3044F HG A1C LEVEL LT 7.0%: CPT | Mod: ,,, | Performed by: OTOLARYNGOLOGY

## 2022-03-31 PROCEDURE — 3008F BODY MASS INDEX DOCD: CPT | Mod: ,,, | Performed by: OTOLARYNGOLOGY

## 2022-03-31 RX ORDER — METHYLPREDNISOLONE 4 MG/1
TABLET ORAL
Qty: 21 TABLET | Refills: 0 | Status: SHIPPED | OUTPATIENT
Start: 2022-03-31 | End: 2022-04-21

## 2022-03-31 NOTE — ANESTHESIA POSTPROCEDURE EVALUATION
Anesthesia Post Evaluation    Patient: Thea Sun    Procedure(s) Performed: Procedure(s) (LRB):  EXCISION, SALIVARY GLAND, STONE ON RIGHT SIDE (Right)    Final Anesthesia Type: general      Patient location during evaluation: PACU  Patient participation: Yes- Able to Participate  Level of consciousness: awake and alert  Post-procedure vital signs: reviewed and stable  Pain management: adequate  Airway patency: patent  NAUN mitigation strategies: Multimodal analgesia  PONV status at discharge: No PONV  Anesthetic complications: no      Cardiovascular status: blood pressure returned to baseline  Respiratory status: unassisted  Hydration status: euvolemic  Follow-up not needed.          Vitals Value Taken Time   /80 03/25/22 1200   Temp 36.4 °C (97.5 °F) 03/25/22 0915   Pulse 75 03/25/22 1200   Resp 15 03/25/22 1200   SpO2 96 % 03/25/22 1200         Event Time   Out of Recovery 10:15:00         Pain/Meghann Score: No data recorded

## 2022-03-31 NOTE — PROGRESS NOTES
Subjective:       Patient ID: Thea Sun is a 51 y.o. female.    Chief Complaint: Other (Patient had surgery Friday and has had ear pain since.)    HPI  Review of Systems    Objective:      Physical Exam  Healing well ears normal probably referred ear pain  Assessment:       1. Salivary duct stone    2. Acute otalgia, right        Plan:       Medrol   F/u 10 days

## 2022-04-01 DIAGNOSIS — Z79.890 HORMONE REPLACEMENT THERAPY: Primary | ICD-10-CM

## 2022-04-01 RX ORDER — ESTRADIOL 2 MG/1
2 TABLET ORAL DAILY
Qty: 90 TABLET | Refills: 1 | Status: SHIPPED | OUTPATIENT
Start: 2022-04-01 | End: 2023-08-15 | Stop reason: SDUPTHER

## 2022-04-04 ENCOUNTER — TELEPHONE (OUTPATIENT)
Dept: OTOLARYNGOLOGY | Facility: CLINIC | Age: 52
End: 2022-04-04
Payer: COMMERCIAL

## 2022-04-04 DIAGNOSIS — R11.0 NAUSEA: Primary | ICD-10-CM

## 2022-04-04 RX ORDER — PROMETHAZINE HYDROCHLORIDE 25 MG/1
25 TABLET ORAL EVERY 6 HOURS PRN
Qty: 10 TABLET | Refills: 0 | Status: SHIPPED | OUTPATIENT
Start: 2022-04-04 | End: 2023-08-15

## 2022-04-14 ENCOUNTER — OFFICE VISIT (OUTPATIENT)
Dept: OTOLARYNGOLOGY | Facility: CLINIC | Age: 52
End: 2022-04-14
Payer: COMMERCIAL

## 2022-04-14 VITALS — WEIGHT: 133 LBS | BODY MASS INDEX: 21.38 KG/M2 | HEIGHT: 66 IN

## 2022-04-14 DIAGNOSIS — K11.5 SALIVARY DUCT STONE: Primary | ICD-10-CM

## 2022-04-14 PROCEDURE — 1159F MED LIST DOCD IN RCRD: CPT | Mod: ,,, | Performed by: OTOLARYNGOLOGY

## 2022-04-14 PROCEDURE — 1160F RVW MEDS BY RX/DR IN RCRD: CPT | Mod: ,,, | Performed by: OTOLARYNGOLOGY

## 2022-04-14 PROCEDURE — 99213 OFFICE O/P EST LOW 20 MIN: CPT | Mod: S$PBB,,, | Performed by: OTOLARYNGOLOGY

## 2022-04-14 PROCEDURE — 99213 PR OFFICE/OUTPT VISIT, EST, LEVL III, 20-29 MIN: ICD-10-PCS | Mod: S$PBB,,, | Performed by: OTOLARYNGOLOGY

## 2022-04-14 PROCEDURE — 3044F PR MOST RECENT HEMOGLOBIN A1C LEVEL <7.0%: ICD-10-PCS | Mod: ,,, | Performed by: OTOLARYNGOLOGY

## 2022-04-14 PROCEDURE — 1159F PR MEDICATION LIST DOCUMENTED IN MEDICAL RECORD: ICD-10-PCS | Mod: ,,, | Performed by: OTOLARYNGOLOGY

## 2022-04-14 PROCEDURE — 3008F BODY MASS INDEX DOCD: CPT | Mod: ,,, | Performed by: OTOLARYNGOLOGY

## 2022-04-14 PROCEDURE — 1160F PR REVIEW ALL MEDS BY PRESCRIBER/CLIN PHARMACIST DOCUMENTED: ICD-10-PCS | Mod: ,,, | Performed by: OTOLARYNGOLOGY

## 2022-04-14 PROCEDURE — 3044F HG A1C LEVEL LT 7.0%: CPT | Mod: ,,, | Performed by: OTOLARYNGOLOGY

## 2022-04-14 PROCEDURE — 99213 OFFICE O/P EST LOW 20 MIN: CPT | Mod: PBBFAC | Performed by: OTOLARYNGOLOGY

## 2022-04-14 PROCEDURE — 3008F PR BODY MASS INDEX (BMI) DOCUMENTED: ICD-10-PCS | Mod: ,,, | Performed by: OTOLARYNGOLOGY

## 2022-04-14 NOTE — PROGRESS NOTES
Subjective:       Patient ID: Thea Sun is a 51 y.o. female.    Chief Complaint: Follow-up (Patient is a 2 week rtc for excision of salivary stone. She states she is doing well.)    HPI  Review of Systems    Objective:      Physical Exam  General: NAD  Head: Normocephalic, atraumatic, no facial asymmetry/normal strength,  Ears: Both auricules normal in appearance, w/o deformities tympanic membranes normal external auditory canals normal  Nose: External nose w/o deformities normal turbinates no drainage or inflammation  Oral Cavity: Lips, gums, floor of mouth, tongue hard palate, and buccal mucosa without mass/lesion  Oropharynx: Mucosa pink and moist, soft palate, posterior pharynx and oropharyngeal wall without mass/lesion tender right Floor of mouth no new stone   Neck: Supple, symmetric, trachea midline, no palpable mass/lesion, no palpable cervical lymphadenopathy  Skin: Warm and dry, no concerning lesions  Respiratory: Respirations even, unlabored    Assessment:       1. Salivary duct stone        Plan:       Healing well some residual numbness getting better

## 2022-05-17 ENCOUNTER — OFFICE VISIT (OUTPATIENT)
Dept: FAMILY MEDICINE | Facility: CLINIC | Age: 52
End: 2022-05-17
Payer: COMMERCIAL

## 2022-05-17 VITALS
WEIGHT: 135.63 LBS | RESPIRATION RATE: 16 BRPM | BODY MASS INDEX: 21.8 KG/M2 | TEMPERATURE: 98 F | OXYGEN SATURATION: 98 % | HEART RATE: 93 BPM | SYSTOLIC BLOOD PRESSURE: 139 MMHG | HEIGHT: 66 IN | DIASTOLIC BLOOD PRESSURE: 85 MMHG

## 2022-05-17 DIAGNOSIS — R05.9 COUGH: ICD-10-CM

## 2022-05-17 DIAGNOSIS — Z20.822 ENCOUNTER FOR LABORATORY TESTING FOR COVID-19 VIRUS: ICD-10-CM

## 2022-05-17 DIAGNOSIS — J02.9 SORE THROAT: ICD-10-CM

## 2022-05-17 DIAGNOSIS — J06.9 UPPER RESPIRATORY TRACT INFECTION, UNSPECIFIED TYPE: Primary | ICD-10-CM

## 2022-05-17 LAB
CTP QC/QA: YES
FLUAV AG NPH QL: NEGATIVE
FLUBV AG NPH QL: NEGATIVE
SARS-COV-2 AG RESP QL IA.RAPID: NEGATIVE

## 2022-05-17 PROCEDURE — 1159F MED LIST DOCD IN RCRD: CPT | Mod: ,,, | Performed by: FAMILY MEDICINE

## 2022-05-17 PROCEDURE — 3075F SYST BP GE 130 - 139MM HG: CPT | Mod: ,,, | Performed by: FAMILY MEDICINE

## 2022-05-17 PROCEDURE — 99213 PR OFFICE/OUTPT VISIT, EST, LEVL III, 20-29 MIN: ICD-10-PCS | Mod: 25,,, | Performed by: FAMILY MEDICINE

## 2022-05-17 PROCEDURE — 3079F DIAST BP 80-89 MM HG: CPT | Mod: ,,, | Performed by: FAMILY MEDICINE

## 2022-05-17 PROCEDURE — 3008F PR BODY MASS INDEX (BMI) DOCUMENTED: ICD-10-PCS | Mod: ,,, | Performed by: FAMILY MEDICINE

## 2022-05-17 PROCEDURE — 87428 SARSCOV & INF VIR A&B AG IA: CPT | Mod: QW,,, | Performed by: FAMILY MEDICINE

## 2022-05-17 PROCEDURE — 96372 THER/PROPH/DIAG INJ SC/IM: CPT | Mod: ,,, | Performed by: FAMILY MEDICINE

## 2022-05-17 PROCEDURE — 99213 OFFICE O/P EST LOW 20 MIN: CPT | Mod: 25,,, | Performed by: FAMILY MEDICINE

## 2022-05-17 PROCEDURE — 87428 POCT SARS-COV2 (COVID) WITH FLU ANTIGEN: ICD-10-PCS | Mod: QW,,, | Performed by: FAMILY MEDICINE

## 2022-05-17 PROCEDURE — 1160F RVW MEDS BY RX/DR IN RCRD: CPT | Mod: ,,, | Performed by: FAMILY MEDICINE

## 2022-05-17 PROCEDURE — 3079F PR MOST RECENT DIASTOLIC BLOOD PRESSURE 80-89 MM HG: ICD-10-PCS | Mod: ,,, | Performed by: FAMILY MEDICINE

## 2022-05-17 PROCEDURE — 3044F PR MOST RECENT HEMOGLOBIN A1C LEVEL <7.0%: ICD-10-PCS | Mod: ,,, | Performed by: FAMILY MEDICINE

## 2022-05-17 PROCEDURE — 1159F PR MEDICATION LIST DOCUMENTED IN MEDICAL RECORD: ICD-10-PCS | Mod: ,,, | Performed by: FAMILY MEDICINE

## 2022-05-17 PROCEDURE — 3008F BODY MASS INDEX DOCD: CPT | Mod: ,,, | Performed by: FAMILY MEDICINE

## 2022-05-17 PROCEDURE — 96372 PR INJECTION,THERAP/PROPH/DIAG2ST, IM OR SUBCUT: ICD-10-PCS | Mod: ,,, | Performed by: FAMILY MEDICINE

## 2022-05-17 PROCEDURE — 3075F PR MOST RECENT SYSTOLIC BLOOD PRESS GE 130-139MM HG: ICD-10-PCS | Mod: ,,, | Performed by: FAMILY MEDICINE

## 2022-05-17 PROCEDURE — 3044F HG A1C LEVEL LT 7.0%: CPT | Mod: ,,, | Performed by: FAMILY MEDICINE

## 2022-05-17 PROCEDURE — 1160F PR REVIEW ALL MEDS BY PRESCRIBER/CLIN PHARMACIST DOCUMENTED: ICD-10-PCS | Mod: ,,, | Performed by: FAMILY MEDICINE

## 2022-05-17 RX ORDER — AZITHROMYCIN 250 MG/1
TABLET, FILM COATED ORAL
Qty: 6 TABLET | Refills: 0 | Status: SHIPPED | OUTPATIENT
Start: 2022-05-17 | End: 2022-05-22

## 2022-05-17 RX ORDER — FLUTICASONE PROPIONATE 50 MCG
1 SPRAY, SUSPENSION (ML) NASAL DAILY
Qty: 16 G | Refills: 5 | Status: SHIPPED | OUTPATIENT
Start: 2022-05-17 | End: 2023-08-15

## 2022-05-17 RX ORDER — CEFTRIAXONE 1 G/1
1 INJECTION, POWDER, FOR SOLUTION INTRAMUSCULAR; INTRAVENOUS
Status: COMPLETED | OUTPATIENT
Start: 2022-05-17 | End: 2022-05-17

## 2022-05-17 RX ORDER — METHYLPREDNISOLONE ACETATE 40 MG/ML
40 INJECTION, SUSPENSION INTRA-ARTICULAR; INTRALESIONAL; INTRAMUSCULAR; SOFT TISSUE
Status: COMPLETED | OUTPATIENT
Start: 2022-05-17 | End: 2022-05-17

## 2022-05-17 RX ORDER — DEXBROMPHENIRAMINE MALEATE, DEXTROMETHORPHAN HBR, PHENYLEPHRINE HCL 2; 15; 7.5 MG/5ML; MG/5ML; MG/5ML
5 LIQUID ORAL 3 TIMES DAILY PRN
Qty: 120 ML | Refills: 0 | Status: SHIPPED | OUTPATIENT
Start: 2022-05-17 | End: 2022-06-20

## 2022-05-17 RX ORDER — DEXAMETHASONE SODIUM PHOSPHATE 4 MG/ML
4 INJECTION, SOLUTION INTRA-ARTICULAR; INTRALESIONAL; INTRAMUSCULAR; INTRAVENOUS; SOFT TISSUE
Status: COMPLETED | OUTPATIENT
Start: 2022-05-17 | End: 2022-05-17

## 2022-05-17 RX ORDER — ALBUTEROL SULFATE 90 UG/1
2 AEROSOL, METERED RESPIRATORY (INHALATION) EVERY 6 HOURS PRN
Qty: 18 G | Refills: 0 | Status: SHIPPED | OUTPATIENT
Start: 2022-05-17 | End: 2022-06-20

## 2022-05-17 RX ADMIN — CEFTRIAXONE 1 G: 1 INJECTION, POWDER, FOR SOLUTION INTRAMUSCULAR; INTRAVENOUS at 11:05

## 2022-05-17 RX ADMIN — METHYLPREDNISOLONE ACETATE 40 MG: 40 INJECTION, SUSPENSION INTRA-ARTICULAR; INTRALESIONAL; INTRAMUSCULAR; SOFT TISSUE at 11:05

## 2022-05-17 RX ADMIN — DEXAMETHASONE SODIUM PHOSPHATE 4 MG: 4 INJECTION, SOLUTION INTRA-ARTICULAR; INTRALESIONAL; INTRAMUSCULAR; INTRAVENOUS; SOFT TISSUE at 11:05

## 2022-05-17 NOTE — PROGRESS NOTES
Buddy Jorgensen MD    [address] [place] [address]     PATIENT NAME: Thea Sun  : 1970  DATE: 22  MRN: [id number]      Billing Provider: Buddy Jorgensen MD  Level of Service:   Patient PCP Information     Provider PCP Type    Buddy Jorgensen MD General          Reason for Visit / Chief Complaint: Sore Throat, Cough, Sinusitis, and Otalgia (Since Saturday )       Update PCP  Update Chief Complaint         History of Present Illness / Problem Focused Workflow     Thea Sun presents to the clinic with Sore Throat, Cough, Sinusitis, and Otalgia (Since Saturday )     HPI    Review of Systems     Review of Systems   Constitutional: Negative for activity change, appetite change, chills, fatigue and fever.   HENT: Positive for nasal congestion, sinus pressure/congestion and sore throat. Negative for ear pain, hearing loss and postnasal drip.    Respiratory: Positive for cough. Negative for chest tightness, shortness of breath and wheezing.    Cardiovascular: Negative for chest pain, palpitations, leg swelling and claudication.   Gastrointestinal: Negative for abdominal pain, change in bowel habit, constipation, diarrhea, nausea, vomiting and change in bowel habit.   Genitourinary: Negative for dysuria.   Musculoskeletal: Negative for arthralgias, back pain, gait problem and myalgias.   Integumentary:  Negative for rash.   Neurological: Negative for weakness and headaches.   Psychiatric/Behavioral: Negative for suicidal ideas. The patient is not nervous/anxious.         Medical / Social / Family History     Past Medical History:   Diagnosis Date    Endometriosis     Migraines        Past Surgical History:   Procedure Laterality Date    APPENDECTOMY      bowel obstruction      Repair of bowel obstruction.    BREAST LUMPECTOMY Left      SECTION      CHOLECYSTECTOMY      EXCISION OF SALIVARY GLAND Right 3/25/2022    Procedure:  EXCISION, SALIVARY GLAND, STONE ON RIGHT SIDE;  Surgeon: Jcarlos Dean MD;  Location: [facility];  Service: ENT;  Laterality: Right;    HYSTERECTOMY      OOPHORECTOMY      UTERINE SUSPENSION         Social History  Ms.  reports that she has never smoked. She has never used smokeless tobacco. She reports that she does not drink alcohol and does not use drugs.    Family History  Ms.'s family history includes Cancer in her mother; Dementia in her father; Heart failure in her mother and sister; Hypertension in her father, mother, and sister; Stroke in her father.    Medications and Allergies     Medications  Outpatient Medications Marked as Taking for the 5/17/22 encounter (Office Visit) with Buddy Jorgensen MD   Medication Sig Dispense Refill    estradioL (ESTRACE) 2 MG tablet Take 1 tablet (2 mg total) by mouth once daily. 90 tablet 1    promethazine (PHENERGAN) 25 MG tablet Take 1 tablet (25 mg total) by mouth every 6 (six) hours as needed for Nausea. 10 tablet 0     Current Facility-Administered Medications for the 5/17/22 encounter (Office Visit) with Buddy Jorgensen MD   Medication Dose Route Frequency Provider Last Rate Last Admin    [COMPLETED] cefTRIAXone injection 1 g  1 g Intramuscular 1 time in Clinic/HOD Buddy Jorgensen MD   1 g at 05/17/22 1115    [COMPLETED] dexamethasone injection 4 mg  4 mg Intramuscular 1 time in Clinic/HOD Buddy Jorgensen MD   4 mg at 05/17/22 1115    [COMPLETED] methylPREDNISolone acetate injection 40 mg  40 mg Intramuscular 1 time in Clinic/HOD Buddy Jorgensen MD   40 mg at 05/17/22 1115       Allergies  Review of patient's allergies indicates:   Allergen Reactions    Ampicillin Hives    Nalbuphine hcl Nausea And Vomiting       Physical Examination     Vitals:    05/17/22 1036   BP: 139/85   Pulse: 93   Resp: 16   Temp: 98 °F (36.7 °C)     Physical Exam  Vitals and nursing note reviewed.   Constitutional:       General: She is not in acute  distress.     Appearance: Normal appearance. She is not ill-appearing.   HENT:      Right Ear: Tympanic membrane, ear canal and external ear normal.      Left Ear: Tympanic membrane, ear canal and external ear normal.      Nose:      Right Turbinates: Enlarged and swollen.      Left Turbinates: Enlarged and swollen.      Mouth/Throat:      Pharynx: No pharyngeal swelling or posterior oropharyngeal erythema.      Tonsils: No tonsillar exudate.   Eyes:      Extraocular Movements: Extraocular movements intact.      Pupils: Pupils are equal, round, and reactive to light.   Cardiovascular:      Rate and Rhythm: Normal rate and regular rhythm.      Heart sounds: Normal heart sounds.   Pulmonary:      Effort: Pulmonary effort is normal.      Breath sounds: Normal breath sounds.   Abdominal:      General: Bowel sounds are normal.      Palpations: Abdomen is soft.   Musculoskeletal:         General: Normal range of motion.   Skin:     Findings: No rash.   Neurological:      General: No focal deficit present.      Mental Status: She is alert and oriented to person, place, and time. Mental status is at baseline.   Psychiatric:         Mood and Affect: Mood normal.         Behavior: Behavior normal.          Assessment and Plan (including Health Maintenance)      Problem List  Smart Sets  Document Outside HM   :    Plan:         Health Maintenance Due   Topic Date Due    TETANUS VACCINE  Never done    Mammogram  Never done    COVID-19 Vaccine (3 - Pfizer risk series) 02/11/2022       Problem List Items Addressed This Visit        ENT    Upper respiratory tract infection - Primary    Current Assessment & Plan     Negative for COVID-19 , no known exposure            Relevant Medications    fluticasone propionate (FLONASE) 50 mcg/actuation nasal spray    cefTRIAXone injection 1 g (Completed)    dexamethasone injection 4 mg (Completed)    methylPREDNISolone acetate injection 40 mg (Completed)    dexbrompheniramine-phenylep-DM  (POLYTUSSIN DM,DEXBROMPHENIRMN,) 2-7.5-15 mg/5 mL Liqd    azithromycin (Z-XAVI) 250 MG tablet      Other Visit Diagnoses     Encounter for laboratory testing for COVID-19 virus        Relevant Orders    POCT SARS-COV2 (COVID) with Flu Antigen (Completed)    Cough        Relevant Orders    POCT SARS-COV2 (COVID) with Flu Antigen (Completed)    Sore throat        Relevant Orders    POCT SARS-COV2 (COVID) with Flu Antigen (Completed)        Upper respiratory tract infection, unspecified type  -     fluticasone propionate (FLONASE) 50 mcg/actuation nasal spray; 1 spray (50 mcg total) by Each Nostril route once daily.  Dispense: 16 g; Refill: 5  -     cefTRIAXone injection 1 g  -     dexamethasone injection 4 mg  -     methylPREDNISolone acetate injection 40 mg  -     dexbrompheniramine-phenylep-DM (POLYTUSSIN DM,DEXBROMPHENIRMN,) 2-7.5-15 mg/5 mL Liqd; Take 5 mLs by mouth 3 (three) times daily as needed (for COUGH and CONGESTION).  Dispense: 120 mL; Refill: 0  -     azithromycin (Z-XAVI) 250 MG tablet; Take 2 tablets by mouth on day 1; Take 1 tablet by mouth on days 2-5  Dispense: 6 tablet; Refill: 0    Encounter for laboratory testing for COVID-19 virus  -     POCT SARS-COV2 (COVID) with Flu Antigen    Cough  -     POCT SARS-COV2 (COVID) with Flu Antigen    Sore throat  -     POCT SARS-COV2 (COVID) with Flu Antigen    Other orders  -     albuterol (VENTOLIN HFA) 90 mcg/actuation inhaler; Inhale 2 puffs into the lungs every 6 (six) hours as needed for Wheezing. Rescue  Dispense: 18 g; Refill: 0       Health Maintenance Topics with due status: Not Due       Topic Last Completion Date    Colorectal Cancer Screening 02/21/2022    Lipid Panel 02/24/2022    Influenza Vaccine Not Due       Procedures     Future Appointments   Date Time Provider Department Center   8/23/2022  3:30 PM Buddy Jorgensen MD [facility]        No follow-ups on file.     Signature:  Buddy Jorgensen MD  [facility],  [place]  [address] [place] [address] [place] [address]  Phone #: [phone]  Fax #: [phone]    Date of encounter: 5/17/22    There are no Patient Instructions on file for this visit.

## 2022-05-30 PROBLEM — Z00.01 ENCOUNTER FOR ANNUAL GENERAL MEDICAL EXAMINATION WITH ABNORMAL FINDINGS IN ADULT: Status: RESOLVED | Noted: 2022-02-24 | Resolved: 2022-05-30

## 2022-06-06 ENCOUNTER — TELEPHONE (OUTPATIENT)
Dept: FAMILY MEDICINE | Facility: CLINIC | Age: 52
End: 2022-06-06
Payer: COMMERCIAL

## 2022-06-06 NOTE — TELEPHONE ENCOUNTER
Spoke with dr. Jorgensen he stated he could see her if she got here in the next 15 minutes. Pt states it would take her 30 minutes to get here so she would just got to the ER. Dr. Jorgensen said he could call her in some antiinflammatory medication and a muscle relaxer and she could see us tomorrow. Pt stated she would just go to the ER because she was in soraida much pain.

## 2022-06-06 NOTE — TELEPHONE ENCOUNTER
Called c/o upper back pain right side under her right shoulder blade that started on Saturday. She states pain is getting worse feels like a pulled muscle. She states pain is so bad she is about to leave work and wants to know if she can be seen this afternoon and if she can not be seen can any medication be called in to help with the pain.

## 2022-06-10 ENCOUNTER — OFFICE VISIT (OUTPATIENT)
Dept: FAMILY MEDICINE | Facility: CLINIC | Age: 52
End: 2022-06-10
Payer: COMMERCIAL

## 2022-06-10 VITALS
HEART RATE: 92 BPM | OXYGEN SATURATION: 98 % | BODY MASS INDEX: 21.96 KG/M2 | RESPIRATION RATE: 16 BRPM | TEMPERATURE: 98 F | HEIGHT: 66 IN | SYSTOLIC BLOOD PRESSURE: 162 MMHG | DIASTOLIC BLOOD PRESSURE: 99 MMHG | WEIGHT: 136.63 LBS

## 2022-06-10 DIAGNOSIS — M60.88 OTHER MYOSITIS OF OTHER SITE: ICD-10-CM

## 2022-06-10 DIAGNOSIS — M62.830 MUSCLE SPASM OF BACK: ICD-10-CM

## 2022-06-10 DIAGNOSIS — M54.6 ACUTE BILATERAL THORACIC BACK PAIN: Primary | ICD-10-CM

## 2022-06-10 PROCEDURE — 3008F BODY MASS INDEX DOCD: CPT | Mod: ,,, | Performed by: FAMILY MEDICINE

## 2022-06-10 PROCEDURE — 3044F HG A1C LEVEL LT 7.0%: CPT | Mod: ,,, | Performed by: FAMILY MEDICINE

## 2022-06-10 PROCEDURE — 1159F MED LIST DOCD IN RCRD: CPT | Mod: ,,, | Performed by: FAMILY MEDICINE

## 2022-06-10 PROCEDURE — 3080F DIAST BP >= 90 MM HG: CPT | Mod: ,,, | Performed by: FAMILY MEDICINE

## 2022-06-10 PROCEDURE — 3044F PR MOST RECENT HEMOGLOBIN A1C LEVEL <7.0%: ICD-10-PCS | Mod: ,,, | Performed by: FAMILY MEDICINE

## 2022-06-10 PROCEDURE — 99213 PR OFFICE/OUTPT VISIT, EST, LEVL III, 20-29 MIN: ICD-10-PCS | Mod: 25,,, | Performed by: FAMILY MEDICINE

## 2022-06-10 PROCEDURE — 99213 OFFICE O/P EST LOW 20 MIN: CPT | Mod: 25,,, | Performed by: FAMILY MEDICINE

## 2022-06-10 PROCEDURE — 20552 NJX 1/MLT TRIGGER POINT 1/2: CPT | Mod: ,,, | Performed by: FAMILY MEDICINE

## 2022-06-10 PROCEDURE — 1159F PR MEDICATION LIST DOCUMENTED IN MEDICAL RECORD: ICD-10-PCS | Mod: ,,, | Performed by: FAMILY MEDICINE

## 2022-06-10 PROCEDURE — 1160F RVW MEDS BY RX/DR IN RCRD: CPT | Mod: ,,, | Performed by: FAMILY MEDICINE

## 2022-06-10 PROCEDURE — 3077F PR MOST RECENT SYSTOLIC BLOOD PRESSURE >= 140 MM HG: ICD-10-PCS | Mod: ,,, | Performed by: FAMILY MEDICINE

## 2022-06-10 PROCEDURE — 3008F PR BODY MASS INDEX (BMI) DOCUMENTED: ICD-10-PCS | Mod: ,,, | Performed by: FAMILY MEDICINE

## 2022-06-10 PROCEDURE — 3077F SYST BP >= 140 MM HG: CPT | Mod: ,,, | Performed by: FAMILY MEDICINE

## 2022-06-10 PROCEDURE — 1160F PR REVIEW ALL MEDS BY PRESCRIBER/CLIN PHARMACIST DOCUMENTED: ICD-10-PCS | Mod: ,,, | Performed by: FAMILY MEDICINE

## 2022-06-10 PROCEDURE — 20552 PR INJECT TRIGGER POINT, 1 OR 2: ICD-10-PCS | Mod: ,,, | Performed by: FAMILY MEDICINE

## 2022-06-10 PROCEDURE — 3080F PR MOST RECENT DIASTOLIC BLOOD PRESSURE >= 90 MM HG: ICD-10-PCS | Mod: ,,, | Performed by: FAMILY MEDICINE

## 2022-06-10 RX ORDER — NAPROXEN 500 MG/1
500 TABLET ORAL 2 TIMES DAILY
COMMUNITY
End: 2023-08-15

## 2022-06-10 RX ORDER — LIDOCAINE HYDROCHLORIDE 10 MG/ML
1 INJECTION INFILTRATION; PERINEURAL
Status: COMPLETED | OUTPATIENT
Start: 2022-06-10 | End: 2022-06-10

## 2022-06-10 RX ORDER — CYCLOBENZAPRINE HCL 10 MG
10 TABLET ORAL 2 TIMES DAILY
COMMUNITY
End: 2023-08-15

## 2022-06-10 RX ORDER — TRIAMCINOLONE ACETONIDE 40 MG/ML
80 INJECTION, SUSPENSION INTRA-ARTICULAR; INTRAMUSCULAR
Status: COMPLETED | OUTPATIENT
Start: 2022-06-10 | End: 2022-06-10

## 2022-06-10 RX ADMIN — TRIAMCINOLONE ACETONIDE 80 MG: 40 INJECTION, SUSPENSION INTRA-ARTICULAR; INTRAMUSCULAR at 04:06

## 2022-06-10 RX ADMIN — LIDOCAINE HYDROCHLORIDE 1 ML: 10 INJECTION INFILTRATION; PERINEURAL at 04:06

## 2022-06-10 NOTE — LETTER
Heide 10, 2022    Thea Sun  [address]  [place] [address]         [place] [address] [place]   [place] [address]  Phone: [phone]  Fax: [phone] Heide 10, 2022     Patient: Thea Sun   YOB: 1970   Date of Visit: 6/10/2022       To Whom It May Concern:    It is my medical opinion that Thea Sun should remain out of work until 06/25/2022. Please excuse her from work on 06/18/2022 and 06/19/2022.    If you have any questions or concerns, please don't hesitate to call.    Sincerely,        Buddy Jorgensen MD

## 2022-06-10 NOTE — PROGRESS NOTES
Buddy Jorgensen MD    [address] [place] [address]     PATIENT NAME: Thea Sun  : 1970  DATE: 6/10/22  MRN: [id number]      Billing Provider: Buddy Jorgensen MD  Level of Service:   Patient PCP Information     Provider PCP Type    Buddy Jorgensen MD General          Reason for Visit / Chief Complaint: Back Pain (Upper back pain under right shoulder blade. [facility] and [facility] told it was a pulled muscle.)       Update PCP  Update Chief Complaint         History of Present Illness / Problem Focused Workflow     Thea Sun presents to the clinic with Back Pain (Upper back pain under right shoulder blade. She states [facility] and was told it was a pulled muscle.)     HPI    Review of Systems     Review of Systems   Constitutional: Negative for activity change, appetite change, chills, fatigue and fever.   HENT: Negative for nasal congestion, ear pain, hearing loss, postnasal drip and sore throat.    Respiratory: Negative for cough, chest tightness, shortness of breath and wheezing.    Cardiovascular: Negative for chest pain, palpitations, leg swelling and claudication.   Gastrointestinal: Negative for abdominal pain, change in bowel habit, constipation, diarrhea, nausea, vomiting and change in bowel habit.   Genitourinary: Negative for dysuria.   Musculoskeletal: Positive for back pain. Negative for arthralgias, gait problem and myalgias.   Integumentary:  Negative for rash.   Neurological: Negative for weakness and headaches.   Psychiatric/Behavioral: Negative for suicidal ideas. The patient is not nervous/anxious.         Medical / Social / Family History     Past Medical History:   Diagnosis Date    Endometriosis     Migraines        Past Surgical History:   Procedure Laterality Date    APPENDECTOMY      bowel obstruction      Repair of bowel obstruction.    BREAST  LUMPECTOMY Left      SECTION      CHOLECYSTECTOMY      EXCISION OF SALIVARY GLAND Right 3/25/2022    Procedure: EXCISION, SALIVARY GLAND, STONE ON RIGHT SIDE;  Surgeon: Jcarlos Dean MD;  Location: [facility];  Service: ENT;  Laterality: Right;    HYSTERECTOMY      OOPHORECTOMY      UTERINE SUSPENSION         Social History  Ms.  reports that she has never smoked. She has never used smokeless tobacco. She reports that she does not drink alcohol and does not use drugs.    Family History  Ms.'s family history includes Cancer in her mother; Dementia in her father; Heart failure in her mother and sister; Hypertension in her father, mother, and sister; Stroke in her father.    Medications and Allergies     Medications  Outpatient Medications Marked as Taking for the 6/10/22 encounter (Office Visit) with Buddy Jorgensen MD   Medication Sig Dispense Refill    cyclobenzaprine (FLEXERIL) 10 MG tablet Take 10 mg by mouth 2 (two) times a day.      estradioL (ESTRACE) 2 MG tablet Take 1 tablet (2 mg total) by mouth once daily. 90 tablet 1    fluticasone propionate (FLONASE) 50 mcg/actuation nasal spray 1 spray (50 mcg total) by Each Nostril route once daily. 16 g 5    naproxen (NAPROSYN) 500 MG tablet Take 500 mg by mouth 2 (two) times daily.      promethazine (PHENERGAN) 25 MG tablet Take 1 tablet (25 mg total) by mouth every 6 (six) hours as needed for Nausea. 10 tablet 0       Allergies  Review of patient's allergies indicates:   Allergen Reactions    Ampicillin Hives    Nalbuphine hcl Nausea And Vomiting       Physical Examination     Vitals:    06/10/22 1538   BP: (!) 162/99   Pulse: 92   Resp: 16   Temp: 98 °F (36.7 °C)     Physical Exam  Vitals and nursing note reviewed.   Constitutional:       General: She is not in acute distress.     Appearance: Normal appearance. She is not ill-appearing.   Eyes:      Extraocular Movements: Extraocular movements intact.      Pupils: Pupils are equal,  round, and reactive to light.   Cardiovascular:      Rate and Rhythm: Normal rate and regular rhythm.      Heart sounds: Normal heart sounds.   Pulmonary:      Effort: Pulmonary effort is normal.      Breath sounds: Normal breath sounds.   Abdominal:      General: Bowel sounds are normal.      Palpations: Abdomen is soft.   Musculoskeletal:         General: Normal range of motion.      Thoracic back: Spasms and tenderness present.        Back:    Skin:     Findings: No rash.   Neurological:      General: No focal deficit present.      Mental Status: She is alert and oriented to person, place, and time. Mental status is at baseline.   Psychiatric:         Mood and Affect: Mood normal.         Behavior: Behavior normal.     Procedure Note: Trigger point injection, myositis in the thoracic paraspinal muscles, with spasm. Patient consented to the procedure, and identified to area of pain. I cleaned the area in the usual fashion with alcohol. I then injected 1mL of lidocaine with epi and triamcinolone into three separate muscle spasms, for a total of 3mL. She had quick relief of the symptoms. Blood loss was less than 1ml.     Assessment and Plan (including Health Maintenance)      Problem List  [employer]  Document Outside HM   :    Plan:         Health Maintenance Due   Topic Date Due    Mammogram  Never done    COVID-19 Vaccine (3 - Pfizer risk series) 02/11/2022       Problem List Items Addressed This Visit        Orthopedic    Muscle spasm of back    Acute bilateral thoracic back pain - Primary    Current Assessment & Plan     Trigger point injection of muscle spasms in the middle of her back            Myositis        Acute bilateral thoracic back pain  -     triamcinolone acetonide injection 80 mg  -     LIDOcaine HCL 10 mg/ml (1%) injection 1 mL    Muscle spasm of back    Other myositis of other site       Health Maintenance Topics with due status: Not Due       Topic Last Completion Date    Colorectal Cancer  Screening 02/21/2022    Lipid Panel 02/24/2022    Influenza Vaccine Not Due       Procedures     Future Appointments   Date Time Provider Department Center   8/23/2022  3:30 PM Buddy Jorgensen MD [facility]        Follow up if symptoms worsen or fail to improve.     Signature:  Buddy Jorgensen MD  [address] [place] [address]  Phone #: [phone]  Fax #: [phone]    Date of encounter: 6/10/22    There are no Patient Instructions on file for this visit.

## 2022-06-20 PROBLEM — M54.6 ACUTE BILATERAL THORACIC BACK PAIN: Status: ACTIVE | Noted: 2022-06-20

## 2022-06-20 PROBLEM — M62.830 MUSCLE SPASM OF BACK: Status: ACTIVE | Noted: 2022-06-20

## 2022-06-20 PROBLEM — M60.9 MYOSITIS: Status: ACTIVE | Noted: 2022-06-20

## 2023-08-15 ENCOUNTER — OFFICE VISIT (OUTPATIENT)
Dept: FAMILY MEDICINE | Facility: CLINIC | Age: 53
End: 2023-08-15
Payer: COMMERCIAL

## 2023-08-15 ENCOUNTER — HOSPITAL ENCOUNTER (OUTPATIENT)
Dept: RADIOLOGY | Facility: HOSPITAL | Age: 53
Discharge: HOME | End: 2023-08-15
Attending: NURSE PRACTITIONER
Payer: COMMERCIAL

## 2023-08-15 VITALS
OXYGEN SATURATION: 99 % | SYSTOLIC BLOOD PRESSURE: 139 MMHG | HEART RATE: 93 BPM | BODY MASS INDEX: 21.38 KG/M2 | TEMPERATURE: 99 F | WEIGHT: 133 LBS | HEIGHT: 66 IN | DIASTOLIC BLOOD PRESSURE: 88 MMHG | RESPIRATION RATE: 16 BRPM

## 2023-08-15 DIAGNOSIS — R07.89 CHEST TIGHTNESS: ICD-10-CM

## 2023-08-15 DIAGNOSIS — Z12.31 ENCOUNTER FOR SCREENING MAMMOGRAM FOR MALIGNANT NEOPLASM OF BREAST: ICD-10-CM

## 2023-08-15 DIAGNOSIS — Z79.890 HORMONE REPLACEMENT THERAPY: ICD-10-CM

## 2023-08-15 DIAGNOSIS — R07.89 CHEST TIGHTNESS: Primary | ICD-10-CM

## 2023-08-15 LAB
EKG 12-LEAD: NORMAL
PR INTERVAL: NORMAL
PRT AXES: NORMAL
QRS DURATION: NORMAL
QT/QTC: NORMAL
VENTRICULAR RATE: NORMAL

## 2023-08-15 PROCEDURE — 96372 THER/PROPH/DIAG INJ SC/IM: CPT | Mod: ICN,,, | Performed by: NURSE PRACTITIONER

## 2023-08-15 PROCEDURE — 1160F RVW MEDS BY RX/DR IN RCRD: CPT | Mod: ICN,,, | Performed by: NURSE PRACTITIONER

## 2023-08-15 PROCEDURE — 3079F PR MOST RECENT DIASTOLIC BLOOD PRESSURE 80-89 MM HG: ICD-10-PCS | Mod: ICN,,, | Performed by: NURSE PRACTITIONER

## 2023-08-15 PROCEDURE — 93000 ELECTROCARDIOGRAM COMPLETE: CPT | Mod: ICN,,, | Performed by: NURSE PRACTITIONER

## 2023-08-15 PROCEDURE — 71046 X-RAY EXAM CHEST 2 VIEWS: CPT | Mod: TC,PN

## 2023-08-15 PROCEDURE — 1159F MED LIST DOCD IN RCRD: CPT | Mod: ICN,,, | Performed by: NURSE PRACTITIONER

## 2023-08-15 PROCEDURE — 3075F PR MOST RECENT SYSTOLIC BLOOD PRESS GE 130-139MM HG: ICD-10-PCS | Mod: ICN,,, | Performed by: NURSE PRACTITIONER

## 2023-08-15 PROCEDURE — 93000 POCT EKG 12-LEAD: ICD-10-PCS | Mod: ICN,,, | Performed by: NURSE PRACTITIONER

## 2023-08-15 PROCEDURE — 1159F PR MEDICATION LIST DOCUMENTED IN MEDICAL RECORD: ICD-10-PCS | Mod: ICN,,, | Performed by: NURSE PRACTITIONER

## 2023-08-15 PROCEDURE — 99214 OFFICE O/P EST MOD 30 MIN: CPT | Mod: 25,ICN,, | Performed by: NURSE PRACTITIONER

## 2023-08-15 PROCEDURE — 1160F PR REVIEW ALL MEDS BY PRESCRIBER/CLIN PHARMACIST DOCUMENTED: ICD-10-PCS | Mod: ICN,,, | Performed by: NURSE PRACTITIONER

## 2023-08-15 PROCEDURE — 3008F PR BODY MASS INDEX (BMI) DOCUMENTED: ICD-10-PCS | Mod: ICN,,, | Performed by: NURSE PRACTITIONER

## 2023-08-15 PROCEDURE — 3075F SYST BP GE 130 - 139MM HG: CPT | Mod: ICN,,, | Performed by: NURSE PRACTITIONER

## 2023-08-15 PROCEDURE — 3079F DIAST BP 80-89 MM HG: CPT | Mod: ICN,,, | Performed by: NURSE PRACTITIONER

## 2023-08-15 PROCEDURE — 96372 PR INJECTION,THERAP/PROPH/DIAG2ST, IM OR SUBCUT: ICD-10-PCS | Mod: ICN,,, | Performed by: NURSE PRACTITIONER

## 2023-08-15 PROCEDURE — 99214 PR OFFICE/OUTPT VISIT, EST, LEVL IV, 30-39 MIN: ICD-10-PCS | Mod: 25,ICN,, | Performed by: NURSE PRACTITIONER

## 2023-08-15 PROCEDURE — 3008F BODY MASS INDEX DOCD: CPT | Mod: ICN,,, | Performed by: NURSE PRACTITIONER

## 2023-08-15 RX ORDER — BETAMETHASONE SODIUM PHOSPHATE AND BETAMETHASONE ACETATE 3; 3 MG/ML; MG/ML
6 INJECTION, SUSPENSION INTRA-ARTICULAR; INTRALESIONAL; INTRAMUSCULAR; SOFT TISSUE
Status: COMPLETED | OUTPATIENT
Start: 2023-08-15 | End: 2023-08-15

## 2023-08-15 RX ORDER — ESTRADIOL 2 MG/1
2 TABLET ORAL DAILY
Qty: 30 TABLET | Refills: 1 | Status: SHIPPED | OUTPATIENT
Start: 2023-08-15 | End: 2023-11-09 | Stop reason: SDUPTHER

## 2023-08-15 RX ORDER — ALBUTEROL SULFATE 90 UG/1
2 AEROSOL, METERED RESPIRATORY (INHALATION) EVERY 6 HOURS PRN
Qty: 18 G | Refills: 0 | Status: SHIPPED | OUTPATIENT
Start: 2023-08-15 | End: 2024-02-11

## 2023-08-15 RX ADMIN — BETAMETHASONE SODIUM PHOSPHATE AND BETAMETHASONE ACETATE 6 MG: 3; 3 INJECTION, SUSPENSION INTRA-ARTICULAR; INTRALESIONAL; INTRAMUSCULAR; SOFT TISSUE at 02:08

## 2023-08-15 NOTE — PATIENT INSTRUCTIONS
Use inhaler as needed. Seek medical attention at emergency department for any chest pain or worsening in shortness of breath. Will call patient with xray results.

## 2023-08-15 NOTE — PROGRESS NOTES
Kaye Perkins DNP, FNP    [address] [place] [address]     PATIENT NAME: Thea Sun  : 1970  DATE: 8/15/23  MRN: [id number]      Billing Provider: Kaye Perkins DNP, FNP  Level of Service:   Patient PCP Information       Provider PCP Type    Buddy Jorgensen MD General            Reason for Visit / Chief Complaint: Chest Pain (Complains of tightness in her chest starting  morning. Denies any previous heart problems. ), Shortness of Breath, and Bloated       Update PCP  Update Chief Complaint         History of Present Illness / Problem Focused Workflow     Thea Sun presents to the clinic with Chest Pain (Complains of tightness in her chest starting  morning. Denies any previous heart problems. ), Shortness of Breath, and Bloated     Chest Pain   Associated symptoms include shortness of breath. Pertinent negatives include no abdominal pain, back pain, cough, fever, headaches, nausea, palpitations, vomiting or weakness.   Shortness of Breath  Pertinent negatives include no abdominal pain, chest pain, ear pain, fever, headaches, leg swelling, rash, sore throat, vomiting or wheezing.       Review of Systems     Review of Systems   Constitutional:  Negative for activity change, appetite change, chills, fatigue and fever.   HENT:  Negative for nasal congestion, ear pain, hearing loss, postnasal drip and sore throat.    Respiratory:  Positive for chest tightness and shortness of breath. Negative for cough and wheezing.    Cardiovascular:  Negative for chest pain, palpitations, leg swelling and claudication.   Gastrointestinal:  Negative for abdominal pain, change in bowel habit, constipation, diarrhea, nausea, vomiting and change in bowel habit.   Genitourinary:  Negative for dysuria.   Musculoskeletal:  Negative for arthralgias, back pain, gait problem and myalgias.   Integumentary:  Negative for rash.   Neurological:  Negative for  weakness and headaches.   Psychiatric/Behavioral:  Negative for suicidal ideas. The patient is not nervous/anxious.         Medical / Social / Family History     Past Medical History:   Diagnosis Date    Endometriosis     Migraines        Past Surgical History:   Procedure Laterality Date    APPENDECTOMY      bowel obstruction      Repair of bowel obstruction.    BREAST LUMPECTOMY Left      SECTION      CHOLECYSTECTOMY      EXCISION OF SALIVARY GLAND Right 3/25/2022    Procedure: EXCISION, SALIVARY GLAND, STONE ON RIGHT SIDE;  Surgeon: Jcarlos Dean MD;  Location: [facility];  Service: ENT;  Laterality: Right;    HYSTERECTOMY      OOPHORECTOMY      UTERINE SUSPENSION         Social History  Ms. Thea Sun  reports that she has never smoked. She has never been exposed to tobacco smoke. She has never used smokeless tobacco. She reports that she does not drink alcohol and does not use drugs.    Family History  Ms. Thea Sun's family history includes Cancer in her mother; Dementia in her father; Heart disease in her mother and sister; Heart failure in her mother and sister; Hypertension in her father, mother, and sister; Stroke in her father.    Medications and Allergies     Medications  Outpatient Medications Marked as Taking for the 8/15/23 encounter (Office Visit) with Kaye Perkins DNP, FNP   Medication Sig Dispense Refill    [DISCONTINUED] estradioL (ESTRACE) 2 MG tablet Take 1 tablet (2 mg total) by mouth once daily. 90 tablet 1     Current Facility-Administered Medications for the 8/15/23 encounter (Office Visit) with Kaye Perkins DNP, FNP   Medication Dose Route Frequency Provider Last Rate Last Admin    [COMPLETED] betamethasone acetate-betamethasone sodium phosphate injection 6 mg  6 mg Intramuscular 1 time in Clinic/HOD Kaye Perkins DNP, FNP   6 mg at 08/15/23 7637       Allergies  Review of patient's allergies indicates:   Allergen Reactions    Ampicillin Hives  "   Nalbuphine hcl Nausea And Vomiting       Physical Examination     Vitals:    08/15/23 1400   BP: 139/88   BP Location: Right arm   Patient Position: Sitting   BP Method: Medium (Automatic)   Pulse: 93   Resp: 16   Temp: 98.5 °F (36.9 °C)   TempSrc: Oral   SpO2: 99%   Weight: 60.3 kg (133 lb)   Height: 5' 6" (1.676 m)     Physical Exam  Vitals and nursing note reviewed.   Constitutional:       General: She is not in acute distress.     Appearance: Normal appearance. She is not ill-appearing.   HENT:      Nose: Nose normal.      Mouth/Throat:      Mouth: Mucous membranes are moist.   Eyes:      Extraocular Movements: Extraocular movements intact.      Pupils: Pupils are equal, round, and reactive to light.   Cardiovascular:      Rate and Rhythm: Normal rate and regular rhythm.      Pulses: Normal pulses.      Heart sounds: Normal heart sounds. No murmur heard.     Comments: EKG SR @ 92  Pulmonary:      Effort: Pulmonary effort is normal. No respiratory distress.      Breath sounds: Normal breath sounds. No wheezing, rhonchi or rales.   Chest:      Chest wall: Tenderness present.   Abdominal:      General: Bowel sounds are normal.      Palpations: Abdomen is soft.   Musculoskeletal:         General: Normal range of motion.      Cervical back: Normal range of motion and neck supple.      Right lower leg: No edema.      Left lower leg: No edema.   Skin:     General: Skin is warm and dry.      Findings: No rash.   Neurological:      General: No focal deficit present.      Mental Status: She is alert and oriented to person, place, and time. Mental status is at baseline.   Psychiatric:         Mood and Affect: Mood normal.         Behavior: Behavior normal.        Assessment and Plan (including Health Maintenance)      Problem List  Smart Sets  Document Outside HM   :    Plan:         Health Maintenance Due   Topic Date Due    TETANUS VACCINE  Never done    Shingles Vaccine (1 of 2) Never done    COVID-19 Vaccine (3 " - Pfizer risk series) 02/11/2022    Mammogram  03/22/2023       Problem List Items Addressed This Visit    None  Visit Diagnoses       Chest tightness    -  Primary    Relevant Medications    betamethasone acetate-betamethasone sodium phosphate injection 6 mg (Completed)    albuterol (VENTOLIN HFA) 90 mcg/actuation inhaler    Other Relevant Orders    POCT EKG 12-LEAD (Manually Resulted by Ordering Provider) (Completed)    X-Ray Chest PA And Lateral    Encounter for screening mammogram for malignant neoplasm of breast        Relevant Orders    Mammo Digital Screening Bilat    Hormone replacement therapy        Relevant Medications    estradioL (ESTRACE) 2 MG tablet          Chest tightness  -     POCT EKG 12-LEAD (Manually Resulted by Ordering Provider)  -     betamethasone acetate-betamethasone sodium phosphate injection 6 mg  -     X-Ray Chest PA And Lateral; Future; Expected date: 08/15/2023  -     albuterol (VENTOLIN HFA) 90 mcg/actuation inhaler; Inhale 2 puffs into the lungs every 6 (six) hours as needed for Shortness of Breath (chest tightness).  Dispense: 18 g; Refill: 0    Encounter for screening mammogram for malignant neoplasm of breast  -     Mammo Digital Screening Bilat; Future; Expected date: 08/15/2023    Hormone replacement therapy  -     estradioL (ESTRACE) 2 MG tablet; Take 1 tablet (2 mg total) by mouth once daily.  Dispense: 30 tablet; Refill: 1       Health Maintenance Topics with due status: Not Due       Topic Last Completion Date    Colorectal Cancer Screening 02/21/2022    Lipid Panel 02/24/2022    Influenza Vaccine Not Due           No future appointments.     Follow up if symptoms worsen or fail to improve.     Signature:  Kaye Perkins DNP, FNP  [address] [place] [address]  Phone #: [phone]  Fax #: [phone]    Date of encounter: 8/15/23    Patient Instructions   Use inhaler as needed. Seek medical attention at emergency department  for any chest pain or worsening in shortness of breath. Will call patient with xray results.

## 2023-08-16 ENCOUNTER — OFFICE VISIT (OUTPATIENT)
Dept: FAMILY MEDICINE | Facility: CLINIC | Age: 53
End: 2023-08-16
Payer: COMMERCIAL

## 2023-08-16 VITALS
TEMPERATURE: 99 F | SYSTOLIC BLOOD PRESSURE: 165 MMHG | WEIGHT: 131.81 LBS | DIASTOLIC BLOOD PRESSURE: 107 MMHG | BODY MASS INDEX: 21.18 KG/M2 | HEIGHT: 66 IN | RESPIRATION RATE: 18 BRPM | HEART RATE: 79 BPM | OXYGEN SATURATION: 99 %

## 2023-08-16 DIAGNOSIS — I10 ESSENTIAL HYPERTENSION, BENIGN: ICD-10-CM

## 2023-08-16 DIAGNOSIS — Z20.822 ENCOUNTER FOR LABORATORY TESTING FOR COVID-19 VIRUS: ICD-10-CM

## 2023-08-16 DIAGNOSIS — R07.89 CHEST TIGHTNESS: ICD-10-CM

## 2023-08-16 DIAGNOSIS — J34.89 POSTERIOR RHINORRHEA: Primary | ICD-10-CM

## 2023-08-16 PROCEDURE — 99214 OFFICE O/P EST MOD 30 MIN: CPT | Mod: ,,, | Performed by: NURSE PRACTITIONER

## 2023-08-16 PROCEDURE — 99214 PR OFFICE/OUTPT VISIT, EST, LEVL IV, 30-39 MIN: ICD-10-PCS | Mod: ,,, | Performed by: NURSE PRACTITIONER

## 2023-08-16 PROCEDURE — 3077F SYST BP >= 140 MM HG: CPT | Mod: ,,, | Performed by: NURSE PRACTITIONER

## 2023-08-16 PROCEDURE — 1159F MED LIST DOCD IN RCRD: CPT | Mod: ,,, | Performed by: NURSE PRACTITIONER

## 2023-08-16 PROCEDURE — 99051 PR MEDICAL SERVICES, EVE/WKEND/HOLIDAY: ICD-10-PCS | Mod: ,,, | Performed by: NURSE PRACTITIONER

## 2023-08-16 PROCEDURE — 3008F BODY MASS INDEX DOCD: CPT | Mod: ,,, | Performed by: NURSE PRACTITIONER

## 2023-08-16 PROCEDURE — 1159F PR MEDICATION LIST DOCUMENTED IN MEDICAL RECORD: ICD-10-PCS | Mod: ,,, | Performed by: NURSE PRACTITIONER

## 2023-08-16 PROCEDURE — 87428 POCT SARS-COV2 (COVID) WITH FLU ANTIGEN: ICD-10-PCS | Mod: QW,,, | Performed by: NURSE PRACTITIONER

## 2023-08-16 PROCEDURE — 3008F PR BODY MASS INDEX (BMI) DOCUMENTED: ICD-10-PCS | Mod: ,,, | Performed by: NURSE PRACTITIONER

## 2023-08-16 PROCEDURE — 3077F PR MOST RECENT SYSTOLIC BLOOD PRESSURE >= 140 MM HG: ICD-10-PCS | Mod: ,,, | Performed by: NURSE PRACTITIONER

## 2023-08-16 PROCEDURE — 1160F RVW MEDS BY RX/DR IN RCRD: CPT | Mod: ,,, | Performed by: NURSE PRACTITIONER

## 2023-08-16 PROCEDURE — 3080F DIAST BP >= 90 MM HG: CPT | Mod: ,,, | Performed by: NURSE PRACTITIONER

## 2023-08-16 PROCEDURE — 3080F PR MOST RECENT DIASTOLIC BLOOD PRESSURE >= 90 MM HG: ICD-10-PCS | Mod: ,,, | Performed by: NURSE PRACTITIONER

## 2023-08-16 PROCEDURE — 99051 MED SERV EVE/WKEND/HOLIDAY: CPT | Mod: ,,, | Performed by: NURSE PRACTITIONER

## 2023-08-16 PROCEDURE — 1160F PR REVIEW ALL MEDS BY PRESCRIBER/CLIN PHARMACIST DOCUMENTED: ICD-10-PCS | Mod: ,,, | Performed by: NURSE PRACTITIONER

## 2023-08-16 PROCEDURE — 87428 SARSCOV & INF VIR A&B AG IA: CPT | Mod: QW,,, | Performed by: NURSE PRACTITIONER

## 2023-08-16 NOTE — PROGRESS NOTES
Kaye Perkins DNP, TRENT    [address] [place] [address]     PATIENT NAME: Thea Sun  : 1970  DATE: 23  MRN: [id number]      Billing Provider: Kaye Perkins DNP, FNP  Level of Service:   Patient PCP Information       Provider PCP Type    Buddy Jorgensen MD General            Reason for Visit / Chief Complaint: Sore Throat (Pt says symptoms have gotten worse since her visit yesterday and progressed to a sore, scratchy throat.  She also has pain in her L ear.  Continues with some pain in her mid chest and mid back.  She also reports sinus pressure), Otalgia, Chest Pain, and Sinus Problem       Update PCP  Update Chief Complaint         History of Present Illness / Problem Focused Workflow     Thea Sun presents to the clinic with Sore Throat (Pt says symptoms have gotten worse since her visit yesterday and progressed to a sore, scratchy throat.  She also has pain in her L ear.  Continues with some pain in her mid chest and mid back.  She also reports sinus pressure), Otalgia, Chest Pain, and Sinus Problem   Did not  albuterol rx from pharmacy that was prescribed 1 day ago.   Sore Throat   Associated symptoms include ear pain. Pertinent negatives include no abdominal pain, congestion, coughing, diarrhea, headaches, shortness of breath or vomiting.   Otalgia   Associated symptoms include a sore throat. Pertinent negatives include no abdominal pain, coughing, diarrhea, headaches, hearing loss, rash or vomiting.   Chest Pain   Pertinent negatives include no abdominal pain, back pain, cough, fever, headaches, nausea, palpitations, shortness of breath, vomiting or weakness.   Sinus Problem  Associated symptoms include ear pain and a sore throat. Pertinent negatives include no chills, congestion, coughing, headaches or shortness of breath.       Review of Systems     Review of Systems   Constitutional:  Negative for activity change, appetite  change, chills, fatigue and fever.   HENT:  Positive for ear pain and sore throat. Negative for nasal congestion, hearing loss and postnasal drip.    Respiratory:  Negative for cough, chest tightness, shortness of breath and wheezing.    Cardiovascular:  Positive for chest pain. Negative for palpitations, leg swelling and claudication.   Gastrointestinal:  Negative for abdominal pain, change in bowel habit, constipation, diarrhea, nausea, vomiting and change in bowel habit.   Genitourinary:  Negative for dysuria.   Musculoskeletal:  Negative for arthralgias, back pain, gait problem and myalgias.   Integumentary:  Negative for rash.   Neurological:  Negative for weakness and headaches.   Psychiatric/Behavioral:  Negative for suicidal ideas. The patient is not nervous/anxious.         Medical / Social / Family History     Past Medical History:   Diagnosis Date    Endometriosis     Migraines        Past Surgical History:   Procedure Laterality Date    APPENDECTOMY      bowel obstruction      Repair of bowel obstruction.    BREAST LUMPECTOMY Left      SECTION      CHOLECYSTECTOMY      EXCISION OF SALIVARY GLAND Right 3/25/2022    Procedure: EXCISION, SALIVARY GLAND, STONE ON RIGHT SIDE;  Surgeon: Jcarlos Dean MD;  Location: [facility];  Service: ENT;  Laterality: Right;    HYSTERECTOMY      OOPHORECTOMY      UTERINE SUSPENSION         Social History  Ms. Thea Sun  reports that she has never smoked. She has never been exposed to tobacco smoke. She has never used smokeless tobacco. She reports that she does not drink alcohol and does not use drugs.    Family History  Ms. Thea Sun's family history includes Cancer in her mother; Dementia in her father; Heart disease in her mother and sister; Heart failure in her mother and sister; Hypertension in her father, mother, and sister; Stroke in her father.    Medications and Allergies     Medications  Outpatient Medications Marked as Taking for the  "8/16/23 encounter (Office Visit) with Kaye Perkins, JUANITA, FNP   Medication Sig Dispense Refill    estradioL (ESTRACE) 2 MG tablet Take 1 tablet (2 mg total) by mouth once daily. 30 tablet 1       Allergies  Review of patient's allergies indicates:   Allergen Reactions    Ampicillin Hives    Nalbuphine hcl Nausea And Vomiting       Physical Examination     Vitals:    08/16/23 1728 08/16/23 1737 08/16/23 1757   BP: (!) 167/98 (!) 177/88 (!) 165/107   BP Location: Right arm Left arm Left arm   Patient Position: Sitting Sitting Sitting   BP Method: Medium (Automatic) Medium (Automatic) Medium (Automatic)   Pulse: 79     Resp: 18     Temp: 98.6 °F (37 °C)     TempSrc: Oral     SpO2: 99%     Weight: 59.8 kg (131 lb 12.8 oz)     Height: 5' 6" (1.676 m)       Physical Exam  Vitals and nursing note reviewed.   Constitutional:       General: She is not in acute distress.     Appearance: Normal appearance. She is not ill-appearing.   HENT:      Nose: Nose normal.      Mouth/Throat:      Mouth: Mucous membranes are moist.   Eyes:      Extraocular Movements: Extraocular movements intact.      Pupils: Pupils are equal, round, and reactive to light.   Cardiovascular:      Rate and Rhythm: Normal rate and regular rhythm.      Pulses: Normal pulses.      Heart sounds: Normal heart sounds. No murmur heard.  Pulmonary:      Effort: Pulmonary effort is normal. No respiratory distress.      Breath sounds: Normal breath sounds. No wheezing, rhonchi or rales.   Chest:      Chest wall: Tenderness present.   Abdominal:      General: Bowel sounds are normal.      Palpations: Abdomen is soft.   Musculoskeletal:         General: Normal range of motion.      Cervical back: Normal range of motion and neck supple.      Right lower leg: No edema.      Left lower leg: No edema.   Skin:     General: Skin is warm and dry.      Findings: No rash.   Neurological:      General: No focal deficit present.      Mental Status: She is alert and oriented to " person, place, and time. Mental status is at baseline.   Psychiatric:         Mood and Affect: Mood normal.         Behavior: Behavior normal.          Assessment and Plan (including Health Maintenance)      Problem List  Smart Sets  Document Outside HM   :    Plan:         Health Maintenance Due   Topic Date Due    TETANUS VACCINE  Never done    Shingles Vaccine (1 of 2) Never done    COVID-19 Vaccine (3 - Pfizer risk series) 02/11/2022    Mammogram  03/22/2023       Problem List Items Addressed This Visit    None  Visit Diagnoses       Posterior rhinorrhea    -  Primary    Encounter for laboratory testing for COVID-19 virus        Relevant Orders    POCT SARS-COV2 (COVID) with Flu Antigen (Completed)    Chest tightness        Essential hypertension, benign              Posterior rhinorrhea    Encounter for laboratory testing for COVID-19 virus  -     POCT SARS-COV2 (COVID) with Flu Antigen    Chest tightness    Essential hypertension, benign       Health Maintenance Topics with due status: Not Due       Topic Last Completion Date    Colorectal Cancer Screening 02/21/2022    Lipid Panel 02/24/2022    Influenza Vaccine Not Due           Future Appointments   Date Time Provider Department Center   8/30/2023 10:00 AM Kaye Perkins DNP, FNP [facility]        Follow up in about 2 weeks (around 8/30/2023).     Signature:  Kaye Perkins DNP, FNP  [address] [place] [address]  Phone #: [phone]  Fax #: [phone]    Date of encounter: 8/16/23    Patient Instructions   Monitor blood pressure at home. Follow up with primary care provider if remains greater than 140/90.  Rx from pharmacy that was prescribed 1 day ago. If symptoms worsen, seek medical attention.         no renal colic/no flank pain L/no flank pain R/no incontinence/no dysuria/no bladder infections/no hematuria

## 2023-08-28 ENCOUNTER — OFFICE VISIT (OUTPATIENT)
Dept: FAMILY MEDICINE | Facility: CLINIC | Age: 53
End: 2023-08-28
Payer: COMMERCIAL

## 2023-08-28 VITALS
BODY MASS INDEX: 21.21 KG/M2 | HEART RATE: 73 BPM | HEIGHT: 66 IN | SYSTOLIC BLOOD PRESSURE: 146 MMHG | DIASTOLIC BLOOD PRESSURE: 91 MMHG | TEMPERATURE: 98 F | OXYGEN SATURATION: 100 % | RESPIRATION RATE: 18 BRPM | WEIGHT: 132 LBS

## 2023-08-28 DIAGNOSIS — R03.0 ELEVATED BLOOD PRESSURE READING IN OFFICE WITHOUT DIAGNOSIS OF HYPERTENSION: Primary | ICD-10-CM

## 2023-08-28 PROCEDURE — 1160F RVW MEDS BY RX/DR IN RCRD: CPT | Mod: ,,, | Performed by: NURSE PRACTITIONER

## 2023-08-28 PROCEDURE — 3077F SYST BP >= 140 MM HG: CPT | Mod: ,,, | Performed by: NURSE PRACTITIONER

## 2023-08-28 PROCEDURE — 1160F PR REVIEW ALL MEDS BY PRESCRIBER/CLIN PHARMACIST DOCUMENTED: ICD-10-PCS | Mod: ,,, | Performed by: NURSE PRACTITIONER

## 2023-08-28 PROCEDURE — 3080F DIAST BP >= 90 MM HG: CPT | Mod: ,,, | Performed by: NURSE PRACTITIONER

## 2023-08-28 PROCEDURE — 3080F PR MOST RECENT DIASTOLIC BLOOD PRESSURE >= 90 MM HG: ICD-10-PCS | Mod: ,,, | Performed by: NURSE PRACTITIONER

## 2023-08-28 PROCEDURE — 3077F PR MOST RECENT SYSTOLIC BLOOD PRESSURE >= 140 MM HG: ICD-10-PCS | Mod: ,,, | Performed by: NURSE PRACTITIONER

## 2023-08-28 PROCEDURE — 3008F BODY MASS INDEX DOCD: CPT | Mod: ,,, | Performed by: NURSE PRACTITIONER

## 2023-08-28 PROCEDURE — 99212 PR OFFICE/OUTPT VISIT, EST, LEVL II, 10-19 MIN: ICD-10-PCS | Mod: ,,, | Performed by: NURSE PRACTITIONER

## 2023-08-28 PROCEDURE — 3008F PR BODY MASS INDEX (BMI) DOCUMENTED: ICD-10-PCS | Mod: ,,, | Performed by: NURSE PRACTITIONER

## 2023-08-28 PROCEDURE — 1159F PR MEDICATION LIST DOCUMENTED IN MEDICAL RECORD: ICD-10-PCS | Mod: ,,, | Performed by: NURSE PRACTITIONER

## 2023-08-28 PROCEDURE — 1159F MED LIST DOCD IN RCRD: CPT | Mod: ,,, | Performed by: NURSE PRACTITIONER

## 2023-08-28 PROCEDURE — 99212 OFFICE O/P EST SF 10 MIN: CPT | Mod: ,,, | Performed by: NURSE PRACTITIONER

## 2023-08-28 NOTE — PROGRESS NOTES
Kaye Perkins DNP, TRENT    [address] [place] [address]     PATIENT NAME: Thea Sun  : 1970  DATE: 23  MRN: [id number]      Billing Provider: Kaye Perkins DNP, FNP  Level of Service:   Patient PCP Information       Provider PCP Type    Buddy Jorgensen MD General            Reason for Visit / Chief Complaint: Follow-up (Patient is here for BP follow up. Brought BP log with systolic ranging from 115-157 and diastolic ranging from . Patient has never taken BP medication.)       Update PCP  Update Chief Complaint         History of Present Illness / Problem Focused Workflow     Thea Sun presents to the clinic with Follow-up (Patient is here for BP follow up. Brought BP log with systolic ranging from 115-157 and diastolic ranging from . Patient has never taken BP medication.)     Follow-up  Pertinent negatives include no abdominal pain, arthralgias, change in bowel habit, chest pain, chills, congestion, coughing, fatigue, fever, headaches, myalgias, nausea, rash, sore throat, vomiting or weakness.       Review of Systems     Review of Systems   Constitutional:  Negative for activity change, appetite change, chills, fatigue and fever.   HENT:  Negative for nasal congestion, ear pain, hearing loss, postnasal drip and sore throat.    Respiratory:  Negative for cough, chest tightness, shortness of breath and wheezing.    Cardiovascular:  Negative for chest pain, palpitations, leg swelling and claudication.   Gastrointestinal:  Negative for abdominal pain, change in bowel habit, constipation, diarrhea, nausea, vomiting and change in bowel habit.   Genitourinary:  Negative for dysuria.   Musculoskeletal:  Negative for arthralgias, back pain, gait problem and myalgias.   Integumentary:  Negative for rash.   Neurological:  Negative for weakness and headaches.   Psychiatric/Behavioral:  Negative for suicidal ideas. The patient is not  nervous/anxious.         Medical / Social / Family History     Past Medical History:   Diagnosis Date    Endometriosis     Migraines        Past Surgical History:   Procedure Laterality Date    APPENDECTOMY      bowel obstruction      Repair of bowel obstruction.    BREAST LUMPECTOMY Left      SECTION      CHOLECYSTECTOMY      EXCISION OF SALIVARY GLAND Right 3/25/2022    Procedure: EXCISION, SALIVARY GLAND, STONE ON RIGHT SIDE;  Surgeon: Jcarlos Dean MD;  Location: [facility];  Service: ENT;  Laterality: Right;    HYSTERECTOMY      OOPHORECTOMY      UTERINE SUSPENSION         Social History  Ms. Thea Sun  reports that she has never smoked. She has never been exposed to tobacco smoke. She has never used smokeless tobacco. She reports that she does not drink alcohol and does not use drugs.    Family History  Ms. Thea Sun's family history includes Cancer in her mother; Dementia in her father; Heart disease in her mother and sister; Heart failure in her mother and sister; Hypertension in her father, mother, and sister; Stroke in her father.    Medications and Allergies     Medications  Outpatient Medications Marked as Taking for the 23 encounter (Office Visit) with Kyae Perkins DNP, FNP   Medication Sig Dispense Refill    albuterol (VENTOLIN HFA) 90 mcg/actuation inhaler Inhale 2 puffs into the lungs every 6 (six) hours as needed for Shortness of Breath (chest tightness). 18 g 0    estradioL (ESTRACE) 2 MG tablet Take 1 tablet (2 mg total) by mouth once daily. 30 tablet 1       Allergies  Review of patient's allergies indicates:   Allergen Reactions    Ampicillin Hives    Nalbuphine hcl Nausea And Vomiting       Physical Examination     Vitals:    23 1454 23 1457   BP: (!) 140/90 (!) 146/91   BP Location: Right arm Left arm   Patient Position: Sitting Sitting   BP Method: Large (Automatic) Large (Automatic)   Pulse: 73    Resp: 18    Temp: 98.2 °F (36.8 °C)    TempSrc:  "Oral    SpO2: 100%    Weight: 59.9 kg (132 lb)    Height: 5' 6" (1.676 m)      Physical Exam  Vitals and nursing note reviewed.   Constitutional:       General: She is not in acute distress.     Appearance: Normal appearance. She is not ill-appearing.   Eyes:      Extraocular Movements: Extraocular movements intact.      Pupils: Pupils are equal, round, and reactive to light.   Cardiovascular:      Rate and Rhythm: Normal rate and regular rhythm.      Heart sounds: Normal heart sounds.   Pulmonary:      Effort: Pulmonary effort is normal.      Breath sounds: Normal breath sounds.   Abdominal:      General: Bowel sounds are normal.      Palpations: Abdomen is soft.   Musculoskeletal:         General: Normal range of motion.   Skin:     Findings: No rash.   Neurological:      General: No focal deficit present.      Mental Status: She is alert and oriented to person, place, and time. Mental status is at baseline.   Psychiatric:         Mood and Affect: Mood normal.         Behavior: Behavior normal.          Assessment and Plan (including Health Maintenance)      Problem List  Smart Sets  Document Outside HM   :    Plan:         Health Maintenance Due   Topic Date Due    TETANUS VACCINE  Never done    Shingles Vaccine (1 of 2) Never done    COVID-19 Vaccine (3 - Pfizer risk series) 02/11/2022    Mammogram  03/22/2023       Problem List Items Addressed This Visit    None  Visit Diagnoses       Elevated blood pressure reading in office without diagnosis of hypertension    -  Primary          Elevated blood pressure reading in office without diagnosis of hypertension       Health Maintenance Topics with due status: Not Due       Topic Last Completion Date    Colorectal Cancer Screening 02/21/2022    Lipid Panel 02/24/2022    Influenza Vaccine Not Due         Future Appointments   Date Time Provider Department Center   9/26/2023  1:30 PM Buddy Jorgensen MD [facility]        Follow up in about 4 weeks " (around 9/25/2023).     Signature:  Kaye Perkins DNP, FNP  [address] [place] [address]  Phone #: [phone]  Fax #: [phone]    Date of encounter: 8/28/23    Patient Instructions   Continue to monitor blood pressure. Follow up if consistently > 140/90. Recommend diet and exercise. Recommend limit sodium especially sodas.

## 2023-09-19 ENCOUNTER — OFFICE VISIT (OUTPATIENT)
Dept: FAMILY MEDICINE | Facility: CLINIC | Age: 53
End: 2023-09-19
Payer: COMMERCIAL

## 2023-09-19 VITALS
DIASTOLIC BLOOD PRESSURE: 96 MMHG | TEMPERATURE: 98 F | SYSTOLIC BLOOD PRESSURE: 158 MMHG | WEIGHT: 130.63 LBS | HEART RATE: 82 BPM | OXYGEN SATURATION: 100 % | BODY MASS INDEX: 21.08 KG/M2

## 2023-09-19 DIAGNOSIS — R03.0 ELEVATED BLOOD PRESSURE READING IN OFFICE WITHOUT DIAGNOSIS OF HYPERTENSION: ICD-10-CM

## 2023-09-19 DIAGNOSIS — I10 PRIMARY HYPERTENSION: Primary | ICD-10-CM

## 2023-09-19 DIAGNOSIS — R11.0 NAUSEA: ICD-10-CM

## 2023-09-19 PROBLEM — M54.6 ACUTE BILATERAL THORACIC BACK PAIN: Status: RESOLVED | Noted: 2022-06-20 | Resolved: 2023-09-19

## 2023-09-19 PROBLEM — J06.9 UPPER RESPIRATORY TRACT INFECTION: Status: RESOLVED | Noted: 2022-05-17 | Resolved: 2023-09-19

## 2023-09-19 PROBLEM — M62.830 MUSCLE SPASM OF BACK: Status: RESOLVED | Noted: 2022-06-20 | Resolved: 2023-09-19

## 2023-09-19 PROBLEM — R59.0 ANTERIOR CERVICAL LYMPHADENOPATHY: Status: RESOLVED | Noted: 2022-02-04 | Resolved: 2023-09-19

## 2023-09-19 LAB
ANION GAP SERPL CALCULATED.3IONS-SCNC: 9 MMOL/L (ref 7–16)
BUN SERPL-MCNC: 7 MG/DL (ref 7–18)
BUN/CREAT SERPL: 10 (ref 6–20)
CALCIUM SERPL-MCNC: 9.3 MG/DL (ref 8.5–10.1)
CHLORIDE SERPL-SCNC: 109 MMOL/L (ref 98–107)
CO2 SERPL-SCNC: 26 MMOL/L (ref 21–32)
CREAT SERPL-MCNC: 0.7 MG/DL (ref 0.55–1.02)
CREAT UR-MCNC: 58 MG/DL (ref 28–219)
EGFR (NO RACE VARIABLE) (RUSH/TITUS): 104 ML/MIN/1.73M2
GLUCOSE SERPL-MCNC: 90 MG/DL (ref 74–106)
MICROALBUMIN UR-MCNC: 0.7 MG/DL (ref 0–2.8)
MICROALBUMIN/CREAT RATIO PNL UR: 12.1 MG/G (ref 0–30)
POTASSIUM SERPL-SCNC: 3.3 MMOL/L (ref 3.5–5.1)
SODIUM SERPL-SCNC: 141 MMOL/L (ref 136–145)

## 2023-09-19 PROCEDURE — 4010F PR ACE/ARB THEARPY RXD/TAKEN: ICD-10-PCS | Mod: ,,, | Performed by: NURSE PRACTITIONER

## 2023-09-19 PROCEDURE — 1159F PR MEDICATION LIST DOCUMENTED IN MEDICAL RECORD: ICD-10-PCS | Mod: ,,, | Performed by: NURSE PRACTITIONER

## 2023-09-19 PROCEDURE — 3008F PR BODY MASS INDEX (BMI) DOCUMENTED: ICD-10-PCS | Mod: ,,, | Performed by: NURSE PRACTITIONER

## 2023-09-19 PROCEDURE — 3008F BODY MASS INDEX DOCD: CPT | Mod: ,,, | Performed by: NURSE PRACTITIONER

## 2023-09-19 PROCEDURE — 82043 UR ALBUMIN QUANTITATIVE: CPT | Mod: ,,, | Performed by: CLINICAL MEDICAL LABORATORY

## 2023-09-19 PROCEDURE — 99214 OFFICE O/P EST MOD 30 MIN: CPT | Mod: 25,,, | Performed by: NURSE PRACTITIONER

## 2023-09-19 PROCEDURE — 4010F ACE/ARB THERAPY RXD/TAKEN: CPT | Mod: ,,, | Performed by: NURSE PRACTITIONER

## 2023-09-19 PROCEDURE — 3080F DIAST BP >= 90 MM HG: CPT | Mod: ,,, | Performed by: NURSE PRACTITIONER

## 2023-09-19 PROCEDURE — 3077F PR MOST RECENT SYSTOLIC BLOOD PRESSURE >= 140 MM HG: ICD-10-PCS | Mod: ,,, | Performed by: NURSE PRACTITIONER

## 2023-09-19 PROCEDURE — 3077F SYST BP >= 140 MM HG: CPT | Mod: ,,, | Performed by: NURSE PRACTITIONER

## 2023-09-19 PROCEDURE — 82570 MICROALBUMIN / CREATININE RATIO URINE: ICD-10-PCS | Mod: ,,, | Performed by: CLINICAL MEDICAL LABORATORY

## 2023-09-19 PROCEDURE — 80048 BASIC METABOLIC PNL TOTAL CA: CPT | Mod: ,,, | Performed by: CLINICAL MEDICAL LABORATORY

## 2023-09-19 PROCEDURE — 99214 PR OFFICE/OUTPT VISIT, EST, LEVL IV, 30-39 MIN: ICD-10-PCS | Mod: 25,,, | Performed by: NURSE PRACTITIONER

## 2023-09-19 PROCEDURE — 3080F PR MOST RECENT DIASTOLIC BLOOD PRESSURE >= 90 MM HG: ICD-10-PCS | Mod: ,,, | Performed by: NURSE PRACTITIONER

## 2023-09-19 PROCEDURE — 82043 MICROALBUMIN / CREATININE RATIO URINE: ICD-10-PCS | Mod: ,,, | Performed by: CLINICAL MEDICAL LABORATORY

## 2023-09-19 PROCEDURE — 96372 PR INJECTION,THERAP/PROPH/DIAG2ST, IM OR SUBCUT: ICD-10-PCS | Mod: ,,, | Performed by: NURSE PRACTITIONER

## 2023-09-19 PROCEDURE — 1159F MED LIST DOCD IN RCRD: CPT | Mod: ,,, | Performed by: NURSE PRACTITIONER

## 2023-09-19 PROCEDURE — 82570 ASSAY OF URINE CREATININE: CPT | Mod: ,,, | Performed by: CLINICAL MEDICAL LABORATORY

## 2023-09-19 PROCEDURE — 80048 BASIC METABOLIC PANEL: ICD-10-PCS | Mod: ,,, | Performed by: CLINICAL MEDICAL LABORATORY

## 2023-09-19 PROCEDURE — 96372 THER/PROPH/DIAG INJ SC/IM: CPT | Mod: ,,, | Performed by: NURSE PRACTITIONER

## 2023-09-19 RX ORDER — CLONIDINE HYDROCHLORIDE 0.1 MG/1
0.1 TABLET ORAL
Status: COMPLETED | OUTPATIENT
Start: 2023-09-19 | End: 2023-09-19

## 2023-09-19 RX ORDER — ONDANSETRON 2 MG/ML
4 INJECTION INTRAMUSCULAR; INTRAVENOUS
Status: COMPLETED | OUTPATIENT
Start: 2023-09-19 | End: 2023-09-19

## 2023-09-19 RX ORDER — AMLODIPINE AND OLMESARTAN MEDOXOMIL 10; 20 MG/1; MG/1
1 TABLET ORAL DAILY
Qty: 90 TABLET | Refills: 0 | Status: SHIPPED | OUTPATIENT
Start: 2023-09-19 | End: 2023-09-20 | Stop reason: CLARIF

## 2023-09-19 RX ADMIN — CLONIDINE HYDROCHLORIDE 0.1 MG: 0.1 TABLET ORAL at 10:09

## 2023-09-19 RX ADMIN — ONDANSETRON 4 MG: 2 INJECTION INTRAMUSCULAR; INTRAVENOUS at 10:09

## 2023-09-19 NOTE — PATIENT INSTRUCTIONS
Reduce sodium/salt in your diet  If you need to season your food, use peppers or powders or other food for seasoning  Take your medication as prescribed each day  Monitor and record your blood pressure reading and bring results to each office visit  Goal blood pressure is 120/80 or less but not less than 90/60  Bring your medications to each office visit for review  Loosing 10 lbs will lower your blood pressure to a healthier level  This healthy change will lower your risk for heart attack, stroke, heart disease, and/or death  Your heart is a muscle and needs to be exercised each day to work its best  Maintaining a healthy blood pressure also protects your kidneys  Flushing your kidneys by drinking water may help

## 2023-09-19 NOTE — PROGRESS NOTES
TRENT York    [address] [place] [address]     PATIENT NAME: Thea Sun  : 1970  DATE: 23  MRN: [id number]      Billing Provider: TRENT York  Level of Service: DC OFFICE/OUTPT VISIT, EST, LEVL IV, 30-39 MIN    ASSESSMENT AND PLAN:      Problem List Items Addressed This Visit          Cardiac/Vascular    Primary hypertension - Primary    Current Assessment & Plan     Newly diagnosis problem  During office today: improvement with headache/nausea resolved with treatment of zofran and clonidine 0.1 mg  Begin amlodipine-olmesartan 10/20 mg daily  Low salt diet  Work excuse for two days  Return for recheck in 2 weeks  Goal blood pressure 125/75 or less  Expect improvement with reported fatigue/ decrease in headaches/ lower blood pressure/ stop nocturia           Relevant Medications    amlodipine-olmesartan (AROLDO) 10-20 mg per tablet    Other Relevant Orders    Microalbumin/Creatinine Ratio, Urine    POCT URINALYSIS W/O SCOPE    Basic Metabolic Panel     Other Visit Diagnoses       Elevated blood pressure reading in office without diagnosis of hypertension        Relevant Medications    cloNIDine tablet 0.1 mg (Completed)    Nausea        Relevant Medications    ondansetron injection 4 mg (Completed)            Health Maintenance Topics with due status: Not Due       Topic Last Completion Date    Colorectal Cancer Screening 2022    Lipid Panel 2022     Future Appointments   Date Time Provider Department Center   2023  1:30 PM Buddy Jorgensen MD [facility]   10/3/2023  9:30 AM Buddy Jorgensen MD [facility]      Follow up in about 2 weeks (around 10/3/2023) for recheck. or sooner as needed.      CHIEF COMPLAINT: Hypertension (Patient state she has been having high BP numbers. State she's been monitoring her blood pressure at home, walking, and watching that she eat to prevent from  being put on BP medication. ), Headache (Patient state this morning she woke up with a bad headache. Patient state has a history of migraines and this headache is different from her migraine. ), and Nausea (Patient state she has been feeling nauseated and sick to the stomach since this morning. )           HISTORY OF PRESENT ILLNESS:  Thea Sun is a 53 y.o. female who presents to the clinic today     Thea Sun presents to the clinic with Hypertension (Patient state she has been having high BP numbers. State she's been monitoring her blood pressure at home, walking, and watching that she eat to prevent from being put on BP medication. ), Headache (Patient state this morning she woke up with a bad headache. Patient state has a history of migraines and this headache is different from her migraine. ), and Nausea (Patient state she has been feeling nauseated and sick to the stomach since this morning. )     Patient was seen in clinic by another provider on 8/28/2023: elevated bp, EKG reviewed- SR treated with diet/ exercise and monitor BP  Reports home readings since this visit provided by patient: reporting staying the same  Lowest reading reported 120/94    Today awoke with headache to posterior occipital area  Denies chest pain nor SOB at this time  Reports unable to comb hair due to headache with known migraine history        Hypertension  This is a recurrent problem. The current episode started more than 1 month ago. The problem has been waxing and waning since onset. The problem is uncontrolled. Associated symptoms include headaches and malaise/fatigue. Risk factors for coronary artery disease include stress and family history. Past treatments include lifestyle changes. The current treatment provides no improvement. There are no compliance problems.    Headache   This is a recurrent problem. The current episode started yesterday. The problem occurs constantly. The problem has been waxing and waning. The  pain is located in the Occipital region. The pain does not radiate. The pain quality is not similar to prior headaches. The quality of the pain is described as dull. The pain is at a severity of 3/10. The pain is mild. Associated symptoms include nausea and scalp tenderness. The symptoms are aggravated by unknown. She has tried nothing for the symptoms. The treatment provided no relief. Her past medical history is significant for migraine headaches.   Nausea  This is a new problem. The current episode started today. The problem has been unchanged. Associated symptoms include headaches and nausea. Nothing aggravates the symptoms. She has tried nothing for the symptoms. The treatment provided no relief.       PAST MEDICAL HISTORY:      Past Medical History:   Diagnosis Date    Endometriosis     Migraines      PAST SURGICAL HISTORY:  Past Surgical History:   Procedure Laterality Date    APPENDECTOMY      bowel obstruction      Repair of bowel obstruction.    BREAST LUMPECTOMY Left      SECTION      CHOLECYSTECTOMY      EXCISION OF SALIVARY GLAND Right 3/25/2022    Procedure: EXCISION, SALIVARY GLAND, STONE ON RIGHT SIDE;  Surgeon: Jcarlos Dean MD;  Location: [facility];  Service: ENT;  Laterality: Right;    HYSTERECTOMY      OOPHORECTOMY      UTERINE SUSPENSION       SOCIAL HISTORY:  Social History     Socioeconomic History    Marital status: Single   Tobacco Use    Smoking status: Never     Passive exposure: Never    Smokeless tobacco: Never   Substance and Sexual Activity    Alcohol use: Never    Drug use: Never    Sexual activity: Not Currently     FAMILY HISTORY:       Family History   Problem Relation Age of Onset    Heart disease Mother     Hypertension Mother     Cancer Mother     Heart failure Mother     Stroke Father     Dementia Father     Hypertension Father     Heart disease Sister     Heart failure Sister     Hypertension Sister        ALLERGIES AND MEDICATIONS: updated and reviewed.      "  Review of patient's allergies indicates:   Allergen Reactions    Ampicillin Hives    Nalbuphine hcl Nausea And Vomiting     Medication List with Changes/Refills   New Medications    AMLODIPINE-OLMESARTAN (AROLDO) 10-20 MG PER TABLET    Take 1 tablet by mouth once daily.   Current Medications    ALBUTEROL (VENTOLIN HFA) 90 MCG/ACTUATION INHALER    Inhale 2 puffs into the lungs every 6 (six) hours as needed for Shortness of Breath (chest tightness).    ESTRADIOL (ESTRACE) 2 MG TABLET    Take 1 tablet (2 mg total) by mouth once daily.       SCREENING HISTORY:     Health Maintenance         Date Due Completion Date    TETANUS VACCINE Never done ---    Shingles Vaccine (1 of 2) Never done ---    COVID-19 Vaccine (3 - Pfizer risk series) 02/11/2022 1/14/2022    Mammogram 03/22/2023 3/22/2022    Influenza Vaccine (1) Never done ---    Lipid Panel 02/24/2027 2/24/2022    Colorectal Cancer Screening 02/21/2032 2/21/2022            REVIEW OF SYSTEMS:   Review of Systems   Constitutional:  Positive for malaise/fatigue.   Gastrointestinal:  Positive for nausea.   Neurological:  Positive for headaches.         PHYSICAL EXAM:         BP (!) 158/96 (BP Location: Left arm, Patient Position: Sitting)   Pulse 82   Temp 98.2 °F (36.8 °C) (Oral)   Wt 59.2 kg (130 lb 9.6 oz)   SpO2 100%   BMI 21.08 kg/m²  No LMP recorded. Patient has had a hysterectomy.  Wt Readings from Last 3 Encounters:   09/19/23 59.2 kg (130 lb 9.6 oz)   08/28/23 59.9 kg (132 lb)   08/16/23 59.8 kg (131 lb 12.8 oz)     BP Readings from Last 3 Encounters:   09/19/23 (!) 158/96   08/28/23 (!) 146/91   08/16/23 (!) 165/107     Estimated body mass index is 21.08 kg/m² as calculated from the following:    Height as of 8/28/23: 5' 6" (1.676 m).    Weight as of this encounter: 59.2 kg (130 lb 9.6 oz).     Physical Exam  Constitutional:       Comments: Mild discomfort appearance/ noted weak eyes/ complaint of head/scalp tenderness  Much improved symptoms after " clonidine/zofran   Cardiovascular:      Rate and Rhythm: Normal rate and regular rhythm.      Pulses: Normal pulses.      Comments: Orthostatic blood pressure:  Right lying 154/100 HR 74, sitting 158/98 HR 76, standing 158/102 HR 71  Left lying 152/98 HR 74, sitting 162/100 HR 78, standing 160/100 HR 70  Pulmonary:      Effort: Pulmonary effort is normal.      Breath sounds: Normal breath sounds.   Skin:     General: Skin is warm.   Neurological:      Mental Status: She is alert and oriented to person, place, and time.   Psychiatric:         Mood and Affect: Mood normal.         LABS:     I have reviewed old labs below:  Lab Results   Component Value Date    CHOL 201 (H) 02/24/2022    TRIG 199 (H) 02/24/2022    HDL 56 02/24/2022    LDLCALC 105 02/24/2022    HGBA1C 5.5 02/24/2022           Signature:  TRENT York  [address] [place] [address]  Phone #: [phone]  Fax #: [phone]       Date of encounter: 9/19/23    Patient Instructions   Reduce sodium/salt in your diet  If you need to season your food, use peppers or powders or other food for seasoning  Take your medication as prescribed each day  Monitor and record your blood pressure reading and bring results to each office visit  Goal blood pressure is 120/80 or less but not less than 90/60  Bring your medications to each office visit for review  Loosing 10 lbs will lower your blood pressure to a healthier level  This healthy change will lower your risk for heart attack, stroke, heart disease, and/or death  Your heart is a muscle and needs to be exercised each day to work its best  Maintaining a healthy blood pressure also protects your kidneys  Flushing your kidneys by drinking water may help

## 2023-09-19 NOTE — LETTER
September 19, 2023    Thea Sun  [address]  [place] [address]             [facility]  Family Medicine  [address] [place] [address]  Phone: [phone]  Fax: [phone]   September 19, 2023     Patient: Thea Sun   YOB: 1970   Date of Visit: 9/19/2023       To Whom it May Concern:    Thea Sun was seen in my clinic on 9/19/2023. She may return to work on 09/21/2023 .    Please excuse her from any classes or work missed.    If you have any questions or concerns, please don't hesitate to call.    Sincerely,         Sandra Mane, BRAULIOP

## 2023-09-19 NOTE — ASSESSMENT & PLAN NOTE
Newly diagnosis problem  During office today: improvement with headache/nausea resolved with treatment of zofran and clonidine 0.1 mg  Begin amlodipine-olmesartan 10/20 mg daily  Low salt diet  Work excuse for two days  Return for recheck in 2 weeks  Goal blood pressure 125/75 or less  Expect improvement with reported fatigue/ decrease in headaches/ lower blood pressure/ stop nocturia

## 2023-09-20 ENCOUNTER — TELEPHONE (OUTPATIENT)
Dept: FAMILY MEDICINE | Facility: CLINIC | Age: 53
End: 2023-09-20
Payer: COMMERCIAL

## 2023-09-20 DIAGNOSIS — I10 PRIMARY HYPERTENSION: Primary | ICD-10-CM

## 2023-09-20 RX ORDER — LOSARTAN POTASSIUM 50 MG/1
50 TABLET ORAL DAILY
Qty: 30 TABLET | Refills: 0 | Status: SHIPPED | OUTPATIENT
Start: 2023-09-20 | End: 2024-02-11

## 2023-09-20 RX ORDER — AMLODIPINE BESYLATE 10 MG/1
10 TABLET ORAL DAILY
Qty: 30 TABLET | Refills: 0 | Status: SHIPPED | OUTPATIENT
Start: 2023-09-20 | End: 2023-11-09 | Stop reason: SDUPTHER

## 2023-09-20 NOTE — TELEPHONE ENCOUNTER
Anisa from [facility] called stating that patient insurance will not cover amlodipine-olmesartan but recommending her to try losartan or irbesartan.     Patient also called wanting to inform us she has not been able to get medication due to insurance not covering it. Patient stated that she was off today to try the medication but could not get it. Patient stated when she get her medication today will we be able to extend excuse due to never being on blood pressure medications before.

## 2023-11-09 ENCOUNTER — OFFICE VISIT (OUTPATIENT)
Dept: FAMILY MEDICINE | Facility: CLINIC | Age: 53
End: 2023-11-09
Payer: COMMERCIAL

## 2023-11-09 VITALS
TEMPERATURE: 98 F | OXYGEN SATURATION: 100 % | HEIGHT: 66 IN | BODY MASS INDEX: 21.38 KG/M2 | WEIGHT: 133 LBS | DIASTOLIC BLOOD PRESSURE: 78 MMHG | HEART RATE: 78 BPM | SYSTOLIC BLOOD PRESSURE: 124 MMHG

## 2023-11-09 DIAGNOSIS — I10 PRIMARY HYPERTENSION: Primary | ICD-10-CM

## 2023-11-09 DIAGNOSIS — Z79.890 HORMONE REPLACEMENT THERAPY: ICD-10-CM

## 2023-11-09 PROCEDURE — 3066F NEPHROPATHY DOC TX: CPT | Mod: ,,, | Performed by: NURSE PRACTITIONER

## 2023-11-09 PROCEDURE — 1160F PR REVIEW ALL MEDS BY PRESCRIBER/CLIN PHARMACIST DOCUMENTED: ICD-10-PCS | Mod: ,,, | Performed by: NURSE PRACTITIONER

## 2023-11-09 PROCEDURE — 1160F RVW MEDS BY RX/DR IN RCRD: CPT | Mod: ,,, | Performed by: NURSE PRACTITIONER

## 2023-11-09 PROCEDURE — 4010F PR ACE/ARB THEARPY RXD/TAKEN: ICD-10-PCS | Mod: ,,, | Performed by: NURSE PRACTITIONER

## 2023-11-09 PROCEDURE — 3078F DIAST BP <80 MM HG: CPT | Mod: ,,, | Performed by: NURSE PRACTITIONER

## 2023-11-09 PROCEDURE — 3008F BODY MASS INDEX DOCD: CPT | Mod: ,,, | Performed by: NURSE PRACTITIONER

## 2023-11-09 PROCEDURE — 3074F PR MOST RECENT SYSTOLIC BLOOD PRESSURE < 130 MM HG: ICD-10-PCS | Mod: ,,, | Performed by: NURSE PRACTITIONER

## 2023-11-09 PROCEDURE — 1159F MED LIST DOCD IN RCRD: CPT | Mod: ,,, | Performed by: NURSE PRACTITIONER

## 2023-11-09 PROCEDURE — 3008F PR BODY MASS INDEX (BMI) DOCUMENTED: ICD-10-PCS | Mod: ,,, | Performed by: NURSE PRACTITIONER

## 2023-11-09 PROCEDURE — 99214 PR OFFICE/OUTPT VISIT, EST, LEVL IV, 30-39 MIN: ICD-10-PCS | Mod: ,,, | Performed by: NURSE PRACTITIONER

## 2023-11-09 PROCEDURE — 4010F ACE/ARB THERAPY RXD/TAKEN: CPT | Mod: ,,, | Performed by: NURSE PRACTITIONER

## 2023-11-09 PROCEDURE — 99214 OFFICE O/P EST MOD 30 MIN: CPT | Mod: ,,, | Performed by: NURSE PRACTITIONER

## 2023-11-09 PROCEDURE — 3061F PR NEG MICROALBUMINURIA RESULT DOCUMENTED/REVIEW: ICD-10-PCS | Mod: ,,, | Performed by: NURSE PRACTITIONER

## 2023-11-09 PROCEDURE — 1159F PR MEDICATION LIST DOCUMENTED IN MEDICAL RECORD: ICD-10-PCS | Mod: ,,, | Performed by: NURSE PRACTITIONER

## 2023-11-09 PROCEDURE — 3074F SYST BP LT 130 MM HG: CPT | Mod: ,,, | Performed by: NURSE PRACTITIONER

## 2023-11-09 PROCEDURE — 3066F PR DOCUMENTATION OF TREATMENT FOR NEPHROPATHY: ICD-10-PCS | Mod: ,,, | Performed by: NURSE PRACTITIONER

## 2023-11-09 PROCEDURE — 3078F PR MOST RECENT DIASTOLIC BLOOD PRESSURE < 80 MM HG: ICD-10-PCS | Mod: ,,, | Performed by: NURSE PRACTITIONER

## 2023-11-09 PROCEDURE — 3061F NEG MICROALBUMINURIA REV: CPT | Mod: ,,, | Performed by: NURSE PRACTITIONER

## 2023-11-09 RX ORDER — ESTRADIOL 1 MG/1
1 TABLET ORAL DAILY
Qty: 90 TABLET | Refills: 0 | Status: SHIPPED | OUTPATIENT
Start: 2023-11-09 | End: 2024-02-09 | Stop reason: SDUPTHER

## 2023-11-09 RX ORDER — AMLODIPINE BESYLATE 10 MG/1
10 TABLET ORAL DAILY
Qty: 90 TABLET | Refills: 1 | Status: SHIPPED | OUTPATIENT
Start: 2023-11-09

## 2023-11-09 NOTE — ASSESSMENT & PLAN NOTE
Given off estrogen for a year  Refill estrogen 1 mg daily inplace of 2 mg  Goal: reduce hot flashes, reduce mood changes, improve sleep

## 2023-11-09 NOTE — ASSESSMENT & PLAN NOTE
Chronic stable problem  Continue amlodipine 10 mg daily  Maintain low salt diet  Begin exercise with goal 30 - 60 minutes walking most days  Return for fasting lab/wellness visit in 1 day

## 2023-11-09 NOTE — PROGRESS NOTES
TRENT York    [address] [place] [address]     PATIENT NAME: Thea Sun  : 1970  DATE: 23  MRN: [id number]      Billing Provider: TRENT York  Level of Service: OR OFFICE/OUTPT VISIT, EST, LEVL IV, 30-39 MIN    ASSESSMENT AND PLAN:      Problem List Items Addressed This Visit          Cardiac/Vascular    Primary hypertension - Primary    Current Assessment & Plan     Chronic stable problem  Continue amlodipine 10 mg daily  Maintain low salt diet  Begin exercise with goal 30 - 60 minutes walking most days  Return for fasting lab/wellness visit in 1 day         Relevant Medications    amLODIPine (NORVASC) 10 MG tablet       Endocrine    Hormone replacement therapy    Current Assessment & Plan     Given off estrogen for a year  Refill estrogen 1 mg daily inplace of 2 mg  Goal: reduce hot flashes, reduce mood changes, improve sleep           Relevant Medications    estradioL (ESTRACE) 1 MG tablet       Health Maintenance Topics with due status: Not Due       Topic Last Completion Date    Colorectal Cancer Screening 2022    Lipid Panel 2022     Future Appointments   Date Time Provider Department Center   11/10/2023  8:40 AM Sandra Mane MICAELA [facility]   2023  8:15 AM Sandra Mane [facility]      Follow up in about 1 day (around 11/10/2023) for wellness visit with fasting lab. or sooner as needed.      CHIEF COMPLAINT: Follow-up, Hypertension (Patient here for follow up on BP. Patient state she only been taking the amlodipine. State the losartan with the amlodipine made her very weak. ), and Medication Refill           HISTORY OF PRESENT ILLNESS:  Thea Sun is a 53 y.o. female who presents to the clinic today     Thea Sun presents to the clinic with Follow-up, Hypertension (Patient here for follow up on BP. Patient state she only been taking the  amlodipine. State the losartan with the amlodipine made her very weak. ), and Medication Refill     Last visit 2023: HA elevated blood pressure with nausea/ started on amlodipine-losartan and unable to tolerate losartan  Patient here for refill on amlodipine and report BP much improved and doing well  Patient reports losartan was too much and made her feel weak  Patient also needs wellness visit/ it's been greater than 2 years  Reports out of hormone replacement for at least a year/ reporting trouble sleeping and hot flashes with mild mood swings  Had been on estrogen for 5 years but out of med and request refill      Hypertension  This is a new problem. The current episode started more than 1 month ago. The problem is unchanged. The problem is controlled. There are no associated agents to hypertension. Risk factors for coronary artery disease include family history, stress, sedentary lifestyle and post-menopausal state. Past treatments include calcium channel blockers and lifestyle changes. The current treatment provides significant improvement. There are no compliance problems.    Medication Refill  This is a chronic problem. The current episode started more than 1 year ago. The problem has been unchanged.       PAST MEDICAL HISTORY:      Past Medical History:   Diagnosis Date    Endometriosis     Migraines      PAST SURGICAL HISTORY:  Past Surgical History:   Procedure Laterality Date    APPENDECTOMY      bowel obstruction      Repair of bowel obstruction.    BREAST LUMPECTOMY Left      SECTION      CHOLECYSTECTOMY      EXCISION OF SALIVARY GLAND Right 3/25/2022    Procedure: EXCISION, SALIVARY GLAND, STONE ON RIGHT SIDE;  Surgeon: Jcarlos Dean MD;  Location: [facility];  Service: ENT;  Laterality: Right;    HYSTERECTOMY      OOPHORECTOMY      UTERINE SUSPENSION       SOCIAL HISTORY:  Social History     Socioeconomic History    Marital status: Single   Tobacco Use    Smoking status: Never      Passive exposure: Never    Smokeless tobacco: Never   Substance and Sexual Activity    Alcohol use: Never    Drug use: Never    Sexual activity: Not Currently     FAMILY HISTORY:       Family History   Problem Relation Age of Onset    Heart disease Mother     Hypertension Mother     Cancer Mother     Heart failure Mother     Stroke Father     Dementia Father     Hypertension Father     Heart disease Sister     Heart failure Sister     Hypertension Sister        ALLERGIES AND MEDICATIONS: updated and reviewed.       Review of patient's allergies indicates:   Allergen Reactions    Ampicillin Hives    Nalbuphine hcl Nausea And Vomiting     Medication List with Changes/Refills   Current Medications    ALBUTEROL (VENTOLIN HFA) 90 MCG/ACTUATION INHALER    Inhale 2 puffs into the lungs every 6 (six) hours as needed for Shortness of Breath (chest tightness).    LOSARTAN (COZAAR) 50 MG TABLET    Take 1 tablet (50 mg total) by mouth once daily.   Changed and/or Refilled Medications    Modified Medication Previous Medication    AMLODIPINE (NORVASC) 10 MG TABLET amLODIPine (NORVASC) 10 MG tablet       Take 1 tablet (10 mg total) by mouth once daily.    Take 1 tablet (10 mg total) by mouth once daily.    ESTRADIOL (ESTRACE) 1 MG TABLET estradioL (ESTRACE) 2 MG tablet       Take 1 tablet (1 mg total) by mouth once daily.    Take 1 tablet (2 mg total) by mouth once daily.       SCREENING HISTORY:     Health Maintenance         Date Due Completion Date    TETANUS VACCINE Never done ---    Shingles Vaccine (1 of 2) Never done ---    COVID-19 Vaccine (3 - Pfizer risk series) 02/11/2022 1/14/2022    Mammogram 03/22/2023 3/22/2022    Influenza Vaccine (1) Never done ---    Lipid Panel 02/24/2027 2/24/2022    Colorectal Cancer Screening 02/21/2032 2/21/2022            REVIEW OF SYSTEMS:   Review of Systems   Respiratory: Negative.     Cardiovascular: Negative.          PHYSICAL EXAM:         /78 (BP Location: Left arm, Patient  "Position: Sitting)   Pulse 78   Temp 98.3 °F (36.8 °C) (Oral)   Ht 5' 6" (1.676 m)   Wt 60.3 kg (133 lb)   SpO2 100%   BMI 21.47 kg/m²  No LMP recorded. Patient has had a hysterectomy.  Wt Readings from Last 3 Encounters:   11/09/23 60.3 kg (133 lb)   09/19/23 59.2 kg (130 lb 9.6 oz)   08/28/23 59.9 kg (132 lb)     BP Readings from Last 3 Encounters:   11/09/23 124/78   09/19/23 (!) 158/96   08/28/23 (!) 146/91     Estimated body mass index is 21.47 kg/m² as calculated from the following:    Height as of this encounter: 5' 6" (1.676 m).    Weight as of this encounter: 60.3 kg (133 lb).     Physical Exam  Constitutional:       Appearance: Normal appearance. She is normal weight.   Cardiovascular:      Rate and Rhythm: Normal rate and regular rhythm.      Pulses: Normal pulses.      Heart sounds: Normal heart sounds.   Pulmonary:      Effort: Pulmonary effort is normal.   Musculoskeletal:      Cervical back: Neck supple.   Skin:     Findings: Rash present.      Comments: Erythremic rash to cheeks and forehead   Neurological:      Mental Status: She is alert and oriented to person, place, and time.   Psychiatric:         Mood and Affect: Mood normal.         LABS:     I have reviewed old labs below:  Lab Results   Component Value Date     09/19/2023    K 3.3 (L) 09/19/2023     (H) 09/19/2023    CALCIUM 9.3 09/19/2023    CO2 26 09/19/2023    GLU 90 09/19/2023    BUN 7 09/19/2023    CREATININE 0.70 09/19/2023    ANIONGAP 9 09/19/2023    CHOL 201 (H) 02/24/2022    TRIG 199 (H) 02/24/2022    HDL 56 02/24/2022    LDLCALC 105 02/24/2022    HGBA1C 5.5 02/24/2022    MICROALBUR 0.7 09/19/2023           Signature:  TRENT York  [address] [place] [address]  Phone #: [phone]  Fax #: [phone]       Date of encounter: 11/9/23    Patient Instructions   Reduce sodium/salt in your diet  If you need to season your food, use peppers or powders or other " food for seasoning  Take your medication as prescribed each day  Monitor and record your blood pressure reading and bring results to each office visit  Goal blood pressure is 120/80 or less but not less than 90/60  Bring your medications to each office visit for review  Loosing 10 lbs will lower your blood pressure to a healthier level  This healthy change will lower your risk for heart attack, stroke, heart disease, and/or death  Your heart is a muscle and needs to be exercised each day to work its best  Maintaining a healthy blood pressure also protects your kidneys  Flushing your kidneys by drinking water may help

## 2023-11-10 ENCOUNTER — OFFICE VISIT (OUTPATIENT)
Dept: FAMILY MEDICINE | Facility: CLINIC | Age: 53
End: 2023-11-10
Payer: COMMERCIAL

## 2023-11-10 VITALS
TEMPERATURE: 98 F | DIASTOLIC BLOOD PRESSURE: 75 MMHG | BODY MASS INDEX: 20.92 KG/M2 | WEIGHT: 130.19 LBS | RESPIRATION RATE: 20 BRPM | OXYGEN SATURATION: 100 % | HEIGHT: 66 IN | HEART RATE: 72 BPM | SYSTOLIC BLOOD PRESSURE: 112 MMHG

## 2023-11-10 DIAGNOSIS — Z91.89 ENCOUNTER FOR SCREENING FOR COLORECTAL MALIGNANT NEOPLASM IN HIGH RISK PATIENT: ICD-10-CM

## 2023-11-10 DIAGNOSIS — Z13.1 SCREENING FOR DIABETES MELLITUS: ICD-10-CM

## 2023-11-10 DIAGNOSIS — M77.12 LATERAL EPICONDYLITIS OF LEFT ELBOW: ICD-10-CM

## 2023-11-10 DIAGNOSIS — Z12.31 ENCOUNTER FOR SCREENING MAMMOGRAM FOR BREAST CANCER: ICD-10-CM

## 2023-11-10 DIAGNOSIS — Z12.12 ENCOUNTER FOR SCREENING FOR COLORECTAL MALIGNANT NEOPLASM IN HIGH RISK PATIENT: ICD-10-CM

## 2023-11-10 DIAGNOSIS — Z13.220 SCREENING FOR LIPOID DISORDERS: ICD-10-CM

## 2023-11-10 DIAGNOSIS — Z00.01 ENCOUNTER FOR GENERAL ADULT MEDICAL EXAMINATION WITH ABNORMAL FINDINGS: Primary | ICD-10-CM

## 2023-11-10 DIAGNOSIS — Z12.11 ENCOUNTER FOR SCREENING FOR COLORECTAL MALIGNANT NEOPLASM IN HIGH RISK PATIENT: ICD-10-CM

## 2023-11-10 LAB
CHOLEST SERPL-MCNC: 213 MG/DL (ref 0–200)
CHOLEST/HDLC SERPL: 3.3 {RATIO}
GLUCOSE SERPL-MCNC: 79 MG/DL (ref 74–106)
HDLC SERPL-MCNC: 64 MG/DL (ref 40–60)
LDLC SERPL CALC-MCNC: 132 MG/DL
LDLC/HDLC SERPL: 2.1 {RATIO}
NONHDLC SERPL-MCNC: 149 MG/DL
TRIGL SERPL-MCNC: 84 MG/DL (ref 35–150)
VLDLC SERPL-MCNC: 17 MG/DL

## 2023-11-10 PROCEDURE — 82947 GLUCOSE, FASTING: ICD-10-PCS | Mod: ,,, | Performed by: CLINICAL MEDICAL LABORATORY

## 2023-11-10 PROCEDURE — 99396 PR PREVENTIVE VISIT,EST,40-64: ICD-10-PCS | Mod: 25,,, | Performed by: NURSE PRACTITIONER

## 2023-11-10 PROCEDURE — 80061 LIPID PANEL: CPT | Mod: ,,, | Performed by: CLINICAL MEDICAL LABORATORY

## 2023-11-10 PROCEDURE — 3008F BODY MASS INDEX DOCD: CPT | Mod: ,,, | Performed by: NURSE PRACTITIONER

## 2023-11-10 PROCEDURE — 80061 LIPID PANEL: ICD-10-PCS | Mod: ,,, | Performed by: CLINICAL MEDICAL LABORATORY

## 2023-11-10 PROCEDURE — 1159F MED LIST DOCD IN RCRD: CPT | Mod: ,,, | Performed by: NURSE PRACTITIONER

## 2023-11-10 PROCEDURE — 3078F DIAST BP <80 MM HG: CPT | Mod: ,,, | Performed by: NURSE PRACTITIONER

## 2023-11-10 PROCEDURE — 99396 PREV VISIT EST AGE 40-64: CPT | Mod: 25,,, | Performed by: NURSE PRACTITIONER

## 2023-11-10 PROCEDURE — 3061F NEG MICROALBUMINURIA REV: CPT | Mod: ,,, | Performed by: NURSE PRACTITIONER

## 2023-11-10 PROCEDURE — 3066F PR DOCUMENTATION OF TREATMENT FOR NEPHROPATHY: ICD-10-PCS | Mod: ,,, | Performed by: NURSE PRACTITIONER

## 2023-11-10 PROCEDURE — 4010F ACE/ARB THERAPY RXD/TAKEN: CPT | Mod: ,,, | Performed by: NURSE PRACTITIONER

## 2023-11-10 PROCEDURE — 82947 ASSAY GLUCOSE BLOOD QUANT: CPT | Mod: ,,, | Performed by: CLINICAL MEDICAL LABORATORY

## 2023-11-10 PROCEDURE — 3074F SYST BP LT 130 MM HG: CPT | Mod: ,,, | Performed by: NURSE PRACTITIONER

## 2023-11-10 PROCEDURE — 3061F PR NEG MICROALBUMINURIA RESULT DOCUMENTED/REVIEW: ICD-10-PCS | Mod: ,,, | Performed by: NURSE PRACTITIONER

## 2023-11-10 PROCEDURE — 3008F PR BODY MASS INDEX (BMI) DOCUMENTED: ICD-10-PCS | Mod: ,,, | Performed by: NURSE PRACTITIONER

## 2023-11-10 PROCEDURE — 3066F NEPHROPATHY DOC TX: CPT | Mod: ,,, | Performed by: NURSE PRACTITIONER

## 2023-11-10 PROCEDURE — 1159F PR MEDICATION LIST DOCUMENTED IN MEDICAL RECORD: ICD-10-PCS | Mod: ,,, | Performed by: NURSE PRACTITIONER

## 2023-11-10 PROCEDURE — 3078F PR MOST RECENT DIASTOLIC BLOOD PRESSURE < 80 MM HG: ICD-10-PCS | Mod: ,,, | Performed by: NURSE PRACTITIONER

## 2023-11-10 PROCEDURE — 4010F PR ACE/ARB THEARPY RXD/TAKEN: ICD-10-PCS | Mod: ,,, | Performed by: NURSE PRACTITIONER

## 2023-11-10 PROCEDURE — 3074F PR MOST RECENT SYSTOLIC BLOOD PRESSURE < 130 MM HG: ICD-10-PCS | Mod: ,,, | Performed by: NURSE PRACTITIONER

## 2023-11-10 NOTE — PATIENT INSTRUCTIONS
"Patient Education       High Blood Pressure in Adults   The Basics   Written by the doctors and editors at [facility]   What is high blood pressure? -- High blood pressure is a condition that puts you at risk for heart attack, stroke, and kidney disease. It does not usually cause symptoms. But it can be serious.  When your doctor or nurse tells you your blood pressure, they will say 2 numbers. For instance, your doctor or nurse might say that your blood pressure is "130 over 80." The top number is the pressure inside your arteries when your heart is irena. The bottom number is the pressure inside your arteries when your heart is relaxed.  "Elevated blood pressure" is a term doctors or nurses use as a warning. People with elevated blood pressure do not yet have high blood pressure. But their blood pressure is not as low as it should be for good health.  Many experts define high, elevated, and normal blood pressure as follows:  High - Top number of 130 or above and/or bottom number of 80 or above  Elevated - Top number between 120 and 129 and bottom number of 79 or below  Normal - Top number of 119 or below and bottom number of 79 or below  This information is also in the table (table 1).   How can I lower my blood pressure? -- If your doctor or nurse has prescribed blood pressure medicine, the most important thing you can do is to take it. If it causes side effects, do not just stop taking it. Instead, talk to your doctor or nurse about the problems it causes. They might be able to lower your dose or switch you to another medicine. If cost is a problem, mention that too. They might be able to put you on a less expensive medicine. Taking your blood pressure medicine can keep you from having a heart attack or stroke, and it can save your life!  Can I do anything on my own? -- You have a lot of control over your blood pressure. To lower it:  Lose weight (if you are overweight)  Choose a diet low in fat and rich in " "fruits, vegetables, and low-fat dairy products  Reduce the amount of salt you eat  Do something active for at least 30 minutes a day on most days of the week  Cut down on alcohol (if you drink more than 2 alcoholic drinks per day)  It's also a good idea to get a home blood pressure meter. People who check their own blood pressure at home do better at keeping it low and can sometimes even reduce the amount of medicine they take.  All topics are updated as new evidence becomes available and our peer review process is complete.  This topic retrieved from [employer] on: Sep 21, 2021.  Topic 20941 Version 15.0  Release: 29.4.2 - C29.263  © 2021 UpToDate, Inc. and/or its affiliates. All rights reserved.  table 1: Definition of normal and high blood pressure  Level  Top number  Bottom number    High 130 or above 80 or above   Elevated 120 to 129 79 or below   Normal 119 or below 79 or below   These definitions are from the American College of Cardiology/American Heart Association. Other expert groups might use slightly different definitions.  "Elevated blood pressure" is a term doctor or nurses use as a warning. It means you do not yet have high blood pressure, but your blood pressure is not as low as it should be for good health.  Graphic 56736 Version 6.0  Consumer Information Use and Disclaimer   This information is not specific medical advice and does not replace information you receive from your health care provider. This is only a brief summary of general information. It does NOT include all information about conditions, illnesses, injuries, tests, procedures, treatments, therapies, discharge instructions or life-style choices that may apply to you. You must talk with your health care provider for complete information about your health and treatment options. This information should not be used to decide whether or not to accept your health care provider's advice, instructions or recommendations. Only your health care " "provider has the knowledge and training to provide advice that is right for you. The use of this information is governed by the [employer] End User License Agreement, available at https://www.tsumobi.Zollo/en/solutions/[employer] in [employer] Pocket (PRIMP)/about/leah.The use of [employer] content is governed by the [employer] Terms of Use. ©2021 UpToDate, Inc. All rights reserved.  Copyright   © 2021 UpToDate, Inc. and/or its affiliates. All rights reserved.    Patient Education       High Blood Pressure in Adults   The Basics   Written by the doctors and editors at [facility]   What is high blood pressure? -- High blood pressure is a condition that puts you at risk for heart attack, stroke, and kidney disease. It does not usually cause symptoms. But it can be serious.  When your doctor or nurse tells you your blood pressure, they will say 2 numbers. For instance, your doctor or nurse might say that your blood pressure is "130 over 80." The top number is the pressure inside your arteries when your heart is irena. The bottom number is the pressure inside your arteries when your heart is relaxed.  "Elevated blood pressure" is a term doctors or nurses use as a warning. People with elevated blood pressure do not yet have high blood pressure. But their blood pressure is not as low as it should be for good health.  Many experts define high, elevated, and normal blood pressure as follows:  High - Top number of 130 or above and/or bottom number of 80 or above  Elevated - Top number between 120 and 129 and bottom number of 79 or below  Normal - Top number of 119 or below and bottom number of 79 or below  This information is also in the table (table 1).   How can I lower my blood pressure? -- If your doctor or nurse has prescribed blood pressure medicine, the most important thing you can do is to take it. If it causes side effects, do not just stop taking it. Instead, talk to your doctor or nurse about the problems it causes. They might be able to " "lower your dose or switch you to another medicine. If cost is a problem, mention that too. They might be able to put you on a less expensive medicine. Taking your blood pressure medicine can keep you from having a heart attack or stroke, and it can save your life!  Can I do anything on my own? -- You have a lot of control over your blood pressure. To lower it:  Lose weight (if you are overweight)  Choose a diet low in fat and rich in fruits, vegetables, and low-fat dairy products  Reduce the amount of salt you eat  Do something active for at least 30 minutes a day on most days of the week  Cut down on alcohol (if you drink more than 2 alcoholic drinks per day)  It's also a good idea to get a home blood pressure meter. People who check their own blood pressure at home do better at keeping it low and can sometimes even reduce the amount of medicine they take.  All topics are updated as new evidence becomes available and our peer review process is complete.  This topic retrieved from [employer] on: Sep 21, 2021.  Topic 47763 Version 15.0  Release: 29.4.2 - C29.263  © 2021 UpToDate, Inc. and/or its affiliates. All rights reserved.  table 1: Definition of normal and high blood pressure  Level  Top number  Bottom number    High 130 or above 80 or above   Elevated 120 to 129 79 or below   Normal 119 or below 79 or below   These definitions are from the American College of Cardiology/American Heart Association. Other expert groups might use slightly different definitions.  "Elevated blood pressure" is a term doctor or nurses use as a warning. It means you do not yet have high blood pressure, but your blood pressure is not as low as it should be for good health.  Graphic 22888 Version 6.0  Consumer Information Use and Disclaimer   This information is not specific medical advice and does not replace information you receive from your health care provider. This is only a brief summary of general information. It does NOT include " all information about conditions, illnesses, injuries, tests, procedures, treatments, therapies, discharge instructions or life-style choices that may apply to you. You must talk with your health care provider for complete information about your health and treatment options. This information should not be used to decide whether or not to accept your health care provider's advice, instructions or recommendations. Only your health care provider has the knowledge and training to provide advice that is right for you. The use of this information is governed by the [employer] End User License Agreement, available at https://www.[employer]ers Services/en/solutions/[employer]/about/leah.The use of [employer] content is governed by the [employer] Terms of Use. ©2021 [employer]. All rights reserved.  Copyright   © 2021 UpToDate, Inc. and/or its affiliates. All rights reserved.

## 2023-11-10 NOTE — PROGRESS NOTES
Subjective     Patient ID: Thea Sun is a 53 y.o. female.    Chief Complaint: Healthy You (Here today for healthy you. No other complaints.) and Health Maintenance (Patient needs mammogram rescheduled. Reports she was unable to make her last appointment. She would like to go to [facility]. Reports she does not take vaccine. )    Here for wellness visit  HX: HTN/ hormone replacement therapy/ chronic AR  Reports left elbow pain for a week  Taking OTC Coricidin as needed for allergies  Last mammogram: [facility] one year  Last PAP: greater than 2-3 years ago/ hysterectomy without cervix  Hx: BV with bath use  Colonscopy:5 years or more ago in [place], [place]  HX: colon polyp  Last dentist: one year ago  Last eye exam: longer than 3 years      Elbow Injury  This is a new problem. The current episode started in the past 7 days. The problem occurs constantly. The problem has been unchanged. The symptoms are aggravated by bending and sneezing. She has tried nothing for the symptoms. The treatment provided no relief.     Review of Systems   Constitutional: Negative.    Respiratory: Negative.     Cardiovascular: Negative.    Gastrointestinal: Negative.    Genitourinary: Negative.        Tobacco Use: Low Risk  (11/10/2023)    Patient History     Smoking Tobacco Use: Never     Smokeless Tobacco Use: Never     Passive Exposure: Never     Review of patient's allergies indicates:   Allergen Reactions    Ampicillin Hives    Nalbuphine hcl Nausea And Vomiting     Current Outpatient Medications   Medication Instructions    albuterol (VENTOLIN HFA) 90 mcg/actuation inhaler 2 puffs, Inhalation, Every 6 hours PRN    amLODIPine (NORVASC) 10 mg, Oral, Daily    estradioL (ESTRACE) 1 mg, Oral, Daily    losartan (COZAAR) 50 mg, Oral, Daily     There are no discontinued medications.    Past Medical History:   Diagnosis Date    Endometriosis     Estrogen deficiency     Hypertension     Migraines      Health Maintenance Topics with due status: Not  "Due       Topic Last Completion Date    Colorectal Cancer Screening 02/21/2022    Lipid Panel 02/24/2022     Immunization History   Administered Date(s) Administered    COVID-19 MRNA, LN-S PF (MODERNA HALF 0.25 ML DOSE) 01/14/2022    COVID-19, MRNA, LN-S, PF (Pfizer) (Purple Cap) 04/09/2021, 05/04/2021       Objective     Body mass index is 21.01 kg/m².  Wt Readings from Last 3 Encounters:   11/10/23 59.1 kg (130 lb 3.2 oz)   11/09/23 60.3 kg (133 lb)   09/19/23 59.2 kg (130 lb 9.6 oz)     Ht Readings from Last 3 Encounters:   11/10/23 5' 6" (1.676 m)   11/09/23 5' 6" (1.676 m)   08/28/23 5' 6" (1.676 m)     BP Readings from Last 3 Encounters:   11/10/23 112/75   11/09/23 124/78   09/19/23 (!) 158/96     Temp Readings from Last 3 Encounters:   11/10/23 97.8 °F (36.6 °C) (Oral)   11/09/23 98.3 °F (36.8 °C) (Oral)   09/19/23 98.2 °F (36.8 °C) (Oral)     Pulse Readings from Last 3 Encounters:   11/10/23 72   11/09/23 78   09/19/23 82     Resp Readings from Last 3 Encounters:   11/10/23 20   08/28/23 18   08/16/23 18     PF Readings from Last 3 Encounters:   No data found for PF       Physical Exam  Constitutional:       Appearance: Normal appearance. She is normal weight.   HENT:      Head: Normocephalic.      Right Ear: Tympanic membrane normal.      Left Ear: Tympanic membrane normal.      Ears:      Comments: Bubbles noted with effusion/bilateral tm's intact     Nose:      Comments: Pale hypertrophic turbinates  Eyes:      Pupils: Pupils are equal, round, and reactive to light.   Neck:      Comments: 12.5 inches  Cardiovascular:      Rate and Rhythm: Normal rate and regular rhythm.      Pulses: Normal pulses.      Heart sounds: Normal heart sounds.   Pulmonary:      Effort: Pulmonary effort is normal.      Breath sounds: Normal breath sounds.   Abdominal:      General: Abdomen is flat. Bowel sounds are normal.      Palpations: Abdomen is soft.      Comments: 33 inches   Musculoskeletal:      Left elbow: Tenderness " present in lateral epicondyle.      Cervical back: Normal range of motion and neck supple.   Skin:     General: Skin is warm and dry.      Capillary Refill: Capillary refill takes less than 2 seconds.   Neurological:      General: No focal deficit present.      Mental Status: She is alert and oriented to person, place, and time.   Psychiatric:         Mood and Affect: Mood normal.         Behavior: Behavior normal.         Thought Content: Thought content normal.         Assessment and Plan     Problem List Items Addressed This Visit    None  Visit Diagnoses       Encounter for general adult medical examination with abnormal findings    -  Primary    Screening for diabetes mellitus        Relevant Orders    Glucose, Fasting    Screening for lipoid disorders        Relevant Orders    Lipid Panel    Encounter for screening mammogram for breast cancer        Relevant Orders    Mammo Digital Screening Bilat    Encounter for screening for colorectal malignant neoplasm in high risk patient        Relevant Orders    Colonoscopy    Lateral epicondylitis of left elbow                Plan: continue home medications for HTN  Recommend physical activity at least 5 out of 7 days per week. Walking is the best physical exercise recommended with a goal of 7500 to 29628 steps per day. If lift weights, follow safety measures with having  if using heavy weights and use of weight belt to protect your back and maintain good body mechanics to avoid injury  Maintain healthy blood pressure with goal 130/80 or less  Also reduce salt in diet to maintain healthy blood pressure and reduce swelling  Goal of fluid intake is 1/2 body weight in ounces of water per day  Avoid high fat high carbohydrate diet: to do this avoid high intake of white foods such as potatoes, rice, etc.  If over the age of 50, recommend colonoscopy or Cologuard if appropriate  For Females: Monthly self- breast exams recommended 7-10 days after first day of menses  (if no menses, day of your birthday each month is appropriate)  Yearly mammogram is strongly recommended over the age of 40 or sooner if first degree relative has been diagnosed with breast cancer    Consider local honey 2 teaspoons daily to help reduce allergies--- be sure to change honey with seasons (light sweet honey is spring/summer and darker bitter honey is fall/winter)  Pillows and blankets not washed in washing machine, place in dryer and run on hot setting for 10 minutes once a week to reduce allergens on bed linens.   Also dust ceiling fans weekly or any other fan in bedroom  Vacuum or sweep and mop bedroom floor every 3-4 days to help reduce allergens        I have reviewed the medications, allergies, and problem list.     Goal Actions:    What type of visit is the patient here for today?: Healthy You  Does the patient consent to enroll in Color Me Healthy?: Yes  Is this a Wellness Follow Up?: No  What is your overall wellness goal? (select at least one): Lifestyle modifications, Understand disease process, Improve overall health  Choose 3: Biometric, Nutrition, Lifestyle  Biometric Actions: Attend regularly scheduled office visits  Lifestyle Actions : Obtain yearly mammogram, Take medications as prescribed  Nurtrition Actions: Avoid adding table salt, Read nutrition labels, Eat a well-balanced diet, Eat heart healthy diet

## 2023-11-13 ENCOUNTER — PATIENT OUTREACH (OUTPATIENT)
Dept: ADMINISTRATIVE | Facility: HOSPITAL | Age: 53
End: 2023-11-13

## 2023-11-13 NOTE — PROGRESS NOTES
Population Health Chart Review & Patient Outreach Details      Further Action Needed If Patient Returns Outreach:            Updates Requested / Reviewed:     []  Care Everywhere    []     []  External Sources ([facility], [facility], [facility], etc.)    [] [facility]   [] [facility]   [] Other:    [x]  Care Team Updated   []  Removed  or Duplicate Orders   []  Immunization Reconciliation Completed / Queried    [] [place]   [] [place]   [] [place]   [] [place]      Health Maintenance Topics Addressed and Outreach Outcomes / Actions Taken:             Breast Cancer Screening []  Mammogram Order Placed    []  Mammogram Screening Scheduled    []  External Records Requested & Care Team Updated if Applicable    []  External Records Uploaded & Care Team Updated if Applicable    []  Pt Declined Scheduling Mammogram    []  Pt Will Schedule with External Provider / Order Routed & Care Team Updated if Applicable              Cervical Cancer Screening []  Pap Smear Scheduled in Primary Care or OBGYN    []  External Records Requested & Care Team Updated if Applicable       []  External Records Uploaded, Care Team Updated, & History Updated if Applicable    []  Patient Declined Scheduling Pap Smear    []  Patient Will Schedule with External Provider & Care Team Updated if Applicable                  Colorectal Cancer Screening []  Colonoscopy Case Request / Referral / Home Test Order Placed    []  External Records Requested & Care Team Updated if Applicable    []  External Records Uploaded, Care Team Updated, & History Updated if Applicable    []  Patient Declined Completing Colon Cancer Screening    []  Patient Will Schedule with External Provider & Care Team Updated if Applicable    []  Fit Kit Mailed (add the SmartPhrase under additional notes)    []  Reminded Patient to Complete Home Test                Diabetic Eye Exam []  Eye Exam Screening Order Placed    []  Eye Camera Scheduled or Optometry/Ophthalmology Referral  Placed    []  External Records Requested & Care Team Updated if Applicable    []  External Records Uploaded, Care Team Updated, & History Updated if Applicable    []  Patient Declined Scheduling Eye Exam    []  Patient Will Schedule with External Provider & Care Team Updated if Applicable             Blood Pressure Control []  Primary Care Follow Up Visit Scheduled     []  Remote Blood Pressure Reading Captured    []  Patient Declined Remote Reading or Scheduling Appt - Escalated to PCP    []  Patient Will Call Back or Send Portal Message with Reading                 HbA1c & Other Labs []  Overdue Lab(s) Ordered    []  Overdue Lab(s) Scheduled    []  External Records Uploaded & Care Team Updated if Applicable    []  Primary Care Follow Up Visit Scheduled     []  Reminded Patient to Complete A1c Home Test    []  Patient Declined Scheduling Labs or Will Call Back to Schedule    []  Patient Will Schedule with External Provider / Order Routed, & Care Team Updated if Applicable           Primary Care Appointment []  Primary Care Appt Scheduled    []  Patient Declined Scheduling or Will Call Back to Schedule    []  Pt Established with External Provider, Updated Care Team, & Informed Pt to Notify Payor if Applicable           Medication Adherence /    Statin Use []  Primary Care Appointment Scheduled    []  Patient Reminded to  Prescription    []  Patient Declined, Provider Notified if Needed    []  Sent Provider Message to Review to Evaluate Pt for Statin, Add Exclusion Dx Codes, Document   Exclusion in Problem List, Change Statin Intensity Level to Moderate or High Intensity if Applicable                Osteoporosis Screening []  Dexa Order Placed    []  Dexa Appointment Scheduled    []  External Records Requested & Care Team Updated    []  External Records Uploaded, Care Team Updated, & History Updated if Applicable    []  Patient Declined Scheduling Dexa or Will Call Back to Schedule    []  Patient Will Schedule  with External Provider / Order Routed & Care Team Updated if Applicable       Additional Notes:.  Post visit Population Health review of encounter with date of service  11/10/23 with Tashia.  All required HY components in encounter. [place]   Followup appt for: 11/22/2024 HY

## 2024-02-09 ENCOUNTER — OFFICE VISIT (OUTPATIENT)
Dept: FAMILY MEDICINE | Facility: CLINIC | Age: 54
End: 2024-02-09
Payer: COMMERCIAL

## 2024-02-09 VITALS
SYSTOLIC BLOOD PRESSURE: 121 MMHG | TEMPERATURE: 98 F | HEART RATE: 80 BPM | WEIGHT: 137 LBS | BODY MASS INDEX: 22.02 KG/M2 | DIASTOLIC BLOOD PRESSURE: 82 MMHG | OXYGEN SATURATION: 100 % | HEIGHT: 66 IN | RESPIRATION RATE: 18 BRPM

## 2024-02-09 DIAGNOSIS — Z79.890 HORMONE REPLACEMENT THERAPY: ICD-10-CM

## 2024-02-09 DIAGNOSIS — R07.89 CHEST WALL PAIN: Primary | ICD-10-CM

## 2024-02-09 PROCEDURE — 3008F BODY MASS INDEX DOCD: CPT | Mod: ,,, | Performed by: NURSE PRACTITIONER

## 2024-02-09 PROCEDURE — 1160F RVW MEDS BY RX/DR IN RCRD: CPT | Mod: ,,, | Performed by: NURSE PRACTITIONER

## 2024-02-09 PROCEDURE — 3074F SYST BP LT 130 MM HG: CPT | Mod: ,,, | Performed by: NURSE PRACTITIONER

## 2024-02-09 PROCEDURE — 3079F DIAST BP 80-89 MM HG: CPT | Mod: ,,, | Performed by: NURSE PRACTITIONER

## 2024-02-09 PROCEDURE — 1159F MED LIST DOCD IN RCRD: CPT | Mod: ,,, | Performed by: NURSE PRACTITIONER

## 2024-02-09 PROCEDURE — 99214 OFFICE O/P EST MOD 30 MIN: CPT | Mod: ,,, | Performed by: NURSE PRACTITIONER

## 2024-02-09 RX ORDER — ESTRADIOL 1 MG/1
1 TABLET ORAL DAILY
Qty: 30 TABLET | Refills: 0 | Status: SHIPPED | OUTPATIENT
Start: 2024-02-09

## 2024-02-09 NOTE — LETTER
AUTHORIZATION FOR RELEASE OF   CONFIDENTIAL INFORMATION    Dear Cong Ren,    We are seeing Thea Sun, date of birth 1970, in the clinic at [facility].  hTea Sun has an outstanding lab/procedure at the time we reviewed her chart. In order to help keep her health information updated, she has authorized us to request the following medical record(s):        (  )  MAMMOGRAM                                      (  )  COLONOSCOPY      (  )  PAP SMEAR                                          (  )  OUTSIDE LAB RESULTS     (  )  DEXA SCAN                                          (  )  EYE EXAM            (  )  FOOT EXAM                                          (  )  ENTIRE RECORD     (  )  OUTSIDE IMMUNIZATIONS                 ( X )  __most recent ER record_         Please fax records to TRENT Garcia, at [phone]     If you have any questions, please contact Trina Joshua RN, at [phone].           Patient Name: Thea Sun  : 1970  Patient Phone #: [phone]

## 2024-02-09 NOTE — PROGRESS NOTES
"   Kaye Perkins DNP, FNP    [address] [place] [address]     PATIENT NAME: Thea Sun  : 1970  DATE: 24  MRN: [id number]      Billing Provider: Kaye Perkins DNP, FNP  Level of Service:   Patient PCP Information       Provider PCP Type    [facility]            Reason for Visit / Chief Complaint: Chest Pain (Went to [facility] yesterday with CP.  Had blood drawn, CXR, and EKG./She left without seeing the doctor for results but called this morning an was told by the nurse supervisor "that it wasn't my heart".  She continues with pain when she moves or takes a deep breath or presses on her chest today)       Update PCP  Update Chief Complaint         History of Present Illness / Problem Focused Workflow     Thea Sun presents to the clinic with Chest Pain (Went to [facility] yesterday with CP.  Had blood drawn, CXR, and EKG./She left without seeing the doctor for results but called this morning an was told by the nurse supervisor "that it wasn't my heart".  She continues with pain when she moves or takes a deep breath or presses on her chest today)     Chest Pain   Pertinent negatives include no abdominal pain, back pain, cough, fever, headaches, nausea, palpitations, shortness of breath, vomiting or weakness.       Review of Systems     Review of Systems   Constitutional:  Negative for activity change, appetite change, chills, fatigue and fever.   HENT:  Negative for nasal congestion, ear pain, hearing loss, postnasal drip and sore throat.    Respiratory:  Negative for cough, chest tightness, shortness of breath and wheezing.    Cardiovascular:  Positive for chest pain. Negative for palpitations, leg swelling and claudication.   Gastrointestinal:  Negative for abdominal pain, change in bowel habit, constipation, diarrhea, nausea and vomiting.   Genitourinary:  Negative for dysuria.   Musculoskeletal:  Negative for arthralgias, " back pain, gait problem and myalgias.   Integumentary:  Negative for rash.   Neurological:  Negative for weakness and headaches.   Psychiatric/Behavioral:  Negative for suicidal ideas. The patient is not nervous/anxious.         Medical / Social / Family History     Past Medical History:   Diagnosis Date    Endometriosis     Estrogen deficiency     Hypertension     Migraines        Past Surgical History:   Procedure Laterality Date    APPENDECTOMY      bowel obstruction      Repair of bowel obstruction.    BREAST LUMPECTOMY Left      SECTION      CHOLECYSTECTOMY      EXCISION OF SALIVARY GLAND Right 3/25/2022    Procedure: EXCISION, SALIVARY GLAND, STONE ON RIGHT SIDE;  Surgeon: Jcarlos Dean MD;  Location: [facility];  Service: ENT;  Laterality: Right;    HYSTERECTOMY      OOPHORECTOMY      UTERINE SUSPENSION         Social History  Ms. Thea Sun  reports that she has never smoked. She has never been exposed to tobacco smoke. She has never used smokeless tobacco. She reports that she does not drink alcohol and does not use drugs.    Family History  Ms. Thea Sun's family history includes Cancer in her mother; Dementia in her father; Heart disease in her mother and sister; Heart failure in her mother and sister; Hypertension in her father, mother, and sister; Stroke in her father.    Medications and Allergies     Medications  Outpatient Medications Marked as Taking for the 24 encounter (Office Visit) with Kaye Perkins, JUANITA, FNP   Medication Sig Dispense Refill    amLODIPine (NORVASC) 10 MG tablet Take 1 tablet (10 mg total) by mouth once daily. 90 tablet 1    [DISCONTINUED] estradioL (ESTRACE) 1 MG tablet Take 1 tablet (1 mg total) by mouth once daily. 90 tablet 0       Allergies  Review of patient's allergies indicates:   Allergen Reactions    Ampicillin Hives    Nalbuphine hcl Nausea And Vomiting       Physical Examination     Vitals:    24 1137   BP: 121/82   BP Location: Left  "arm   Patient Position: Sitting   BP Method: Large (Automatic)   Pulse: 80   Resp: 18   Temp: 98.3 °F (36.8 °C)   TempSrc: Oral   SpO2: 100%   Weight: 62.1 kg (137 lb)   Height: 5' 6" (1.676 m)     Physical Exam  Vitals and nursing note reviewed.   Constitutional:       General: She is not in acute distress.  HENT:      Nose: Nose normal.      Mouth/Throat:      Mouth: Mucous membranes are moist.   Eyes:      Pupils: Pupils are equal, round, and reactive to light.   Cardiovascular:      Rate and Rhythm: Normal rate and regular rhythm.      Pulses: Normal pulses.      Heart sounds: Normal heart sounds. No murmur heard.  Pulmonary:      Effort: Pulmonary effort is normal. No respiratory distress.      Breath sounds: Normal breath sounds. No wheezing, rhonchi or rales.   Chest:      Chest wall: Tenderness (upon palpation) present.          Comments: Tenderness upon palpation  Abdominal:      General: Bowel sounds are normal.      Palpations: Abdomen is soft.   Musculoskeletal:         General: Normal range of motion.      Cervical back: Normal range of motion and neck supple.      Right lower leg: No edema.      Left lower leg: No edema.   Skin:     General: Skin is warm and dry.   Neurological:      General: No focal deficit present.      Mental Status: She is alert and oriented to person, place, and time.          Assessment and Plan (including Health Maintenance)      Problem List  Smart Sets  Document Outside HM   :    Plan:     Records obtained from [facility] and reviewed. EKG, CXR,     Health Maintenance Due   Topic Date Due    Mammogram  03/22/2023       Problem List Items Addressed This Visit          Endocrine    Hormone replacement therapy    Relevant Medications    estradioL (ESTRACE) 1 MG tablet     Other Visit Diagnoses       Chest wall pain    -  Primary          Chest wall pain    Hormone replacement therapy  -     estradioL (ESTRACE) 1 MG tablet; Take 1 tablet (1 mg total) by mouth once daily.  Dispense: " 30 tablet; Refill: 0       Health Maintenance Topics with due status: Not Due       Topic Last Completion Date    Colorectal Cancer Screening 02/21/2022    Lipid Panel 11/10/2023         Future Appointments   Date Time Provider Department Center   5/13/2024 12:40 PM [facility]   11/22/2024  8:40 AM Sandra Mane, [facility]        Follow up if symptoms worsen or fail to improve.     Signature:  Kaye Perkins DNP, FNP  [address] [place] [address]  Phone #: [phone]  Fax #: [phone]    Date of encounter: 2/9/24    Patient Instructions   Aleve 2 times a day for 5 days, then as needed. Heat to affected area. Instructed patient that she would not be able to continue HRT if she did not get mammogram done. Pt verbalized understanding.

## 2024-02-11 NOTE — PATIENT INSTRUCTIONS
Aleve 2 times a day for 5 days, then as needed. Heat to affected area. Instructed patient that she would not be able to continue HRT if she did not get mammogram done. Pt verbalized understanding.

## (undated) DEVICE — NEEDLE INSULATED COATED EXTENDED

## (undated) DEVICE — GLOVE SURGICAL PROTEXIS PI SIZE 8.0

## (undated) DEVICE — HANDLE YANKAUER SUCTION K80 LATEX FREE

## (undated) DEVICE — NEEDLE ECLIPSE 25GX1 1/2 IN

## (undated) DEVICE — GLOVE SURGICAL PROTEXIS PI SIZE 6

## (undated) DEVICE — SYRINGE 10-12CC LURE -LOK TIP

## (undated) DEVICE — CDS ENT

## (undated) DEVICE — GLOVE SURGICAL PROTEXIS PI BLUE SIZE 6.0